# Patient Record
Sex: FEMALE | Race: WHITE | Employment: OTHER | URBAN - METROPOLITAN AREA
[De-identification: names, ages, dates, MRNs, and addresses within clinical notes are randomized per-mention and may not be internally consistent; named-entity substitution may affect disease eponyms.]

---

## 2017-05-12 ENCOUNTER — HOSPITAL ENCOUNTER (OUTPATIENT)
Dept: VASCULAR LAB | Age: 67
Discharge: HOME | End: 2017-05-12
Attending: FAMILY MEDICINE | Admitting: FAMILY MEDICINE

## 2017-05-12 DIAGNOSIS — R22.41 LOCALIZED SWELLING, MASS AND LUMP, RIGHT LOWER LIMB: ICD-10-CM

## 2018-01-19 ENCOUNTER — HOSPITAL ENCOUNTER (OUTPATIENT)
Dept: MAMMOGRAPHY | Age: 68
Discharge: HOME | End: 2018-01-19
Attending: FAMILY MEDICINE | Admitting: FAMILY MEDICINE

## 2018-01-19 DIAGNOSIS — Z12.31 ENCOUNTER FOR SCREENING MAMMOGRAM FOR BREAST CANCER: ICD-10-CM

## 2018-01-19 DIAGNOSIS — Z80.3 FAMILY HISTORY OF BREAST CANCER IN SISTER: ICD-10-CM

## 2018-07-13 ENCOUNTER — OFFICE VISIT (OUTPATIENT)
Dept: FAMILY MEDICINE CLINIC | Age: 68
End: 2018-07-13

## 2018-07-13 VITALS
OXYGEN SATURATION: 96 % | HEART RATE: 73 BPM | SYSTOLIC BLOOD PRESSURE: 137 MMHG | TEMPERATURE: 96.8 F | RESPIRATION RATE: 12 BRPM | DIASTOLIC BLOOD PRESSURE: 74 MMHG | HEIGHT: 62 IN | BODY MASS INDEX: 31.1 KG/M2 | WEIGHT: 169 LBS

## 2018-07-13 DIAGNOSIS — L82.1 SEBORRHEIC KERATOSES: ICD-10-CM

## 2018-07-13 DIAGNOSIS — H93.13 TINNITUS OF BOTH EARS: ICD-10-CM

## 2018-07-13 DIAGNOSIS — G60.9 HEREDITARY AND IDIOPATHIC PERIPHERAL NEUROPATHY: ICD-10-CM

## 2018-07-13 DIAGNOSIS — G43.009 MIGRAINE WITHOUT AURA AND WITHOUT STATUS MIGRAINOSUS, NOT INTRACTABLE: ICD-10-CM

## 2018-07-13 DIAGNOSIS — Z23 NEED FOR PNEUMOCOCCAL VACCINATION: ICD-10-CM

## 2018-07-13 DIAGNOSIS — Z23 NEED FOR TD VACCINE: ICD-10-CM

## 2018-07-13 DIAGNOSIS — R73.02 GLUCOSE INTOLERANCE (IMPAIRED GLUCOSE TOLERANCE): ICD-10-CM

## 2018-07-13 DIAGNOSIS — Z00.00 WELL ADULT EXAM: Primary | ICD-10-CM

## 2018-07-13 DIAGNOSIS — E78.00 PURE HYPERCHOLESTEROLEMIA: ICD-10-CM

## 2018-07-13 DIAGNOSIS — Z23 NEED FOR SHINGLES VACCINE: ICD-10-CM

## 2018-07-13 LAB
A/G RATIO: 2 (ref 1.1–2.2)
ALBUMIN SERPL-MCNC: 4.5 G/DL (ref 3.4–5)
ALP BLD-CCNC: 109 U/L (ref 40–129)
ALT SERPL-CCNC: 39 U/L (ref 10–40)
ANION GAP SERPL CALCULATED.3IONS-SCNC: 15 MMOL/L (ref 3–16)
AST SERPL-CCNC: 32 U/L (ref 15–37)
BILIRUB SERPL-MCNC: 1.8 MG/DL (ref 0–1)
BUN BLDV-MCNC: 12 MG/DL (ref 7–20)
CALCIUM SERPL-MCNC: 9.6 MG/DL (ref 8.3–10.6)
CHLORIDE BLD-SCNC: 100 MMOL/L (ref 99–110)
CHOLESTEROL, TOTAL: 204 MG/DL (ref 0–199)
CO2: 27 MMOL/L (ref 21–32)
CREAT SERPL-MCNC: <0.5 MG/DL (ref 0.6–1.2)
DIABETIC RETINOPATHY: NEGATIVE
GFR AFRICAN AMERICAN: >60
GFR NON-AFRICAN AMERICAN: >60
GLOBULIN: 2.2 G/DL
GLUCOSE BLD-MCNC: 161 MG/DL (ref 70–99)
HCT VFR BLD CALC: 43.2 % (ref 36–48)
HDLC SERPL-MCNC: 42 MG/DL (ref 40–60)
HEMOGLOBIN: 15.7 G/DL (ref 12–16)
LDL CHOLESTEROL CALCULATED: ABNORMAL MG/DL
LDL CHOLESTEROL DIRECT: 119 MG/DL
MCH RBC QN AUTO: 34.9 PG (ref 26–34)
MCHC RBC AUTO-ENTMCNC: 36.3 G/DL (ref 31–36)
MCV RBC AUTO: 96.1 FL (ref 80–100)
PDW BLD-RTO: 13 % (ref 12.4–15.4)
PLATELET # BLD: 218 K/UL (ref 135–450)
PMV BLD AUTO: 7.4 FL (ref 5–10.5)
POTASSIUM SERPL-SCNC: 4.2 MMOL/L (ref 3.5–5.1)
RBC # BLD: 4.5 M/UL (ref 4–5.2)
SODIUM BLD-SCNC: 142 MMOL/L (ref 136–145)
TOTAL PROTEIN: 6.7 G/DL (ref 6.4–8.2)
TRIGL SERPL-MCNC: 321 MG/DL (ref 0–150)
TSH REFLEX: 2.24 UIU/ML (ref 0.27–4.2)
VLDLC SERPL CALC-MCNC: ABNORMAL MG/DL
WBC # BLD: 7.3 K/UL (ref 4–11)

## 2018-07-13 PROCEDURE — 90472 IMMUNIZATION ADMIN EACH ADD: CPT | Performed by: FAMILY MEDICINE

## 2018-07-13 PROCEDURE — 90750 HZV VACC RECOMBINANT IM: CPT | Performed by: FAMILY MEDICINE

## 2018-07-13 PROCEDURE — 90732 PPSV23 VACC 2 YRS+ SUBQ/IM: CPT | Performed by: FAMILY MEDICINE

## 2018-07-13 PROCEDURE — 99397 PER PM REEVAL EST PAT 65+ YR: CPT | Performed by: FAMILY MEDICINE

## 2018-07-13 PROCEDURE — 90471 IMMUNIZATION ADMIN: CPT | Performed by: FAMILY MEDICINE

## 2018-07-13 ASSESSMENT — ENCOUNTER SYMPTOMS
CHEST TIGHTNESS: 0
TROUBLE SWALLOWING: 0
VOICE CHANGE: 0
SHORTNESS OF BREATH: 0
CONSTIPATION: 0
ANAL BLEEDING: 0
BLOOD IN STOOL: 0
NAUSEA: 0
CHOKING: 0
SORE THROAT: 0
DIARRHEA: 0
WHEEZING: 0
ABDOMINAL PAIN: 0

## 2018-07-13 ASSESSMENT — PATIENT HEALTH QUESTIONNAIRE - PHQ9
SUM OF ALL RESPONSES TO PHQ9 QUESTIONS 1 & 2: 0
2. FEELING DOWN, DEPRESSED OR HOPELESS: 0
1. LITTLE INTEREST OR PLEASURE IN DOING THINGS: 0
SUM OF ALL RESPONSES TO PHQ QUESTIONS 1-9: 0

## 2018-07-13 NOTE — PATIENT INSTRUCTIONS
knee. Keep one heel touching the floor and the other heel touching the wall. 4. Repeat with your other leg. 5. Do 2 to 4 times for each leg. Hip flexor stretch    1. Kneel on the floor with one knee bent and one leg behind you. Place your forward knee over your foot. Keep your other knee touching the floor. 2. Slowly push your hips forward until you feel a stretch in the upper thigh of your rear leg. 3. Hold the stretch for at least 15 to 30 seconds. Repeat with your other leg. 4. Do 2 to 4 times on each side. Wall sit    1. Stand with your back 10 to 12 inches away from a wall. 2. Lean into the wall until your back is flat against it. 3. Slowly slide down until your knees are slightly bent, pressing your lower back into the wall. 4. Hold for about 6 seconds, then slide back up the wall. 5. Repeat 8 to 12 times. Follow-up care is a key part of your treatment and safety. Be sure to make and go to all appointments, and call your doctor if you are having problems. It's also a good idea to know your test results and keep a list of the medicines you take. Where can you learn more? Go to [email or web address] and sign in to your [employer] account. Enter B645 in the [employer] box to learn more about \"Low Back Pain: Exercises. \"     If you do not have an account, please click on the \"Sign Up Now\" link. Current as of: November 29, 2017  Content Version: 11.6  © 9959-9777 [employer]. Care instructions adapted under license by [facility] ([facility]). If you have questions about a medical condition or this instruction, always ask your healthcare professional. [place] [address] any warranty or liability for your use of this information. Iliotibial Band Syndrome: Exercises  Your Care Instructions  Here are some examples of typical rehabilitation exercises for your condition. Start each exercise slowly.  Ease off the exercise if you start to have

## 2018-07-13 NOTE — PROGRESS NOTES
the right side, and 2+ on the left side. Femoral pulses are 2+ on the right side, and 2+ on the left side. Dorsalis pedis pulses are 2+ on the right side, and 2+ on the left side. Posterior tibial pulses are 2+ on the right side, and 2+ on the left side. Pulmonary/Chest: Effort normal and breath sounds normal. No respiratory distress. Right breast exhibits no inverted nipple, no mass, no nipple discharge, no skin change and no tenderness. Left breast exhibits no inverted nipple, no mass, no nipple discharge, no skin change and no tenderness. Breasts are symmetrical.   Abdominal: Normal appearance and bowel sounds are normal. There is no hepatosplenomegaly. There is no tenderness. There is no CVA tenderness. No hernia. Musculoskeletal: Normal range of motion. Lymphadenopathy:        Head (right side): No submental and no submandibular adenopathy present. Head (left side): No submental and no submandibular adenopathy present. She has no cervical adenopathy. She has no axillary adenopathy. Right: No inguinal and no supraclavicular adenopathy present. Left: No inguinal and no supraclavicular adenopathy present. Neurological: She is alert. She has normal strength and normal reflexes. Reflex Scores:       Patellar reflexes are 2+ on the right side and 2+ on the left side. Skin: Skin is warm and dry. Lesion noted. No bruising and no rash noted. Many 3 to 6 mm scaly light brown to grey well demarcated nevi trunk, face. Psychiatric: She has a normal mood and affect. Her speech is normal and behavior is normal. Judgment and thought content normal. Cognition and memory are normal.         Assessment and Plan       Diagnosis Orders   1.  Well adult exam  Discussed diet, exercise   Calcium and Vitamin D addressed  PAP Not indicated; discussed and she is comfortable with this  Annual to biannual mammogram  Annual influenza vaccine  Dt every 10 years  Colonoscopy every

## 2018-07-14 LAB
ESTIMATED AVERAGE GLUCOSE: 197.3 MG/DL
HBA1C MFR BLD: 8.5 %

## 2018-07-15 PROBLEM — R73.02 GLUCOSE INTOLERANCE (IMPAIRED GLUCOSE TOLERANCE): Status: RESOLVED | Noted: 2018-07-13 | Resolved: 2018-07-15

## 2018-07-15 PROBLEM — E11.9 TYPE 2 DIABETES MELLITUS WITHOUT COMPLICATION, WITHOUT LONG-TERM CURRENT USE OF INSULIN (HCC): Status: ACTIVE | Noted: 2018-07-15

## 2018-07-22 RX ORDER — LANCETS 30 GAUGE
EACH MISCELLANEOUS
Qty: 100 EACH | Refills: 3 | Status: CANCELLED | OUTPATIENT
Start: 2018-07-22

## 2018-07-22 RX ORDER — BLOOD-GLUCOSE METER
1 KIT MISCELLANEOUS DAILY
Qty: 1 KIT | Refills: 0 | Status: CANCELLED | OUTPATIENT
Start: 2018-07-22

## 2018-07-23 ENCOUNTER — OFFICE VISIT (OUTPATIENT)
Dept: FAMILY MEDICINE CLINIC | Age: 68
End: 2018-07-23

## 2018-07-23 VITALS
HEIGHT: 62 IN | DIASTOLIC BLOOD PRESSURE: 78 MMHG | TEMPERATURE: 97 F | BODY MASS INDEX: 30.99 KG/M2 | HEART RATE: 81 BPM | RESPIRATION RATE: 12 BRPM | SYSTOLIC BLOOD PRESSURE: 139 MMHG | WEIGHT: 168.4 LBS

## 2018-07-23 DIAGNOSIS — R73.09 OTHER ABNORMAL GLUCOSE: ICD-10-CM

## 2018-07-23 DIAGNOSIS — E11.9 TYPE 2 DIABETES MELLITUS WITHOUT COMPLICATION, WITHOUT LONG-TERM CURRENT USE OF INSULIN (HCC): Primary | ICD-10-CM

## 2018-07-23 DIAGNOSIS — Z11.59 ENCOUNTER FOR HEPATITIS C SCREENING TEST FOR LOW RISK PATIENT: ICD-10-CM

## 2018-07-23 DIAGNOSIS — E78.00 PURE HYPERCHOLESTEROLEMIA: ICD-10-CM

## 2018-07-23 PROCEDURE — 1090F PRES/ABSN URINE INCON ASSESS: CPT | Performed by: FAMILY MEDICINE

## 2018-07-23 PROCEDURE — 3017F COLORECTAL CA SCREEN DOC REV: CPT | Performed by: FAMILY MEDICINE

## 2018-07-23 PROCEDURE — 1101F PT FALLS ASSESS-DOCD LE1/YR: CPT | Performed by: FAMILY MEDICINE

## 2018-07-23 PROCEDURE — 3045F PR MOST RECENT HEMOGLOBIN A1C LEVEL 7.0-9.0%: CPT | Performed by: FAMILY MEDICINE

## 2018-07-23 PROCEDURE — 1036F TOBACCO NON-USER: CPT | Performed by: FAMILY MEDICINE

## 2018-07-23 PROCEDURE — 4040F PNEUMOC VAC/ADMIN/RCVD: CPT | Performed by: FAMILY MEDICINE

## 2018-07-23 PROCEDURE — 2022F DILAT RTA XM EVC RTNOPTHY: CPT | Performed by: FAMILY MEDICINE

## 2018-07-23 PROCEDURE — 99214 OFFICE O/P EST MOD 30 MIN: CPT | Performed by: FAMILY MEDICINE

## 2018-07-23 PROCEDURE — G8399 PT W/DXA RESULTS DOCUMENT: HCPCS | Performed by: FAMILY MEDICINE

## 2018-07-23 PROCEDURE — G8427 DOCREV CUR MEDS BY ELIG CLIN: HCPCS | Performed by: FAMILY MEDICINE

## 2018-07-23 PROCEDURE — 1123F ACP DISCUSS/DSCN MKR DOCD: CPT | Performed by: FAMILY MEDICINE

## 2018-07-23 PROCEDURE — G8417 CALC BMI ABV UP PARAM F/U: HCPCS | Performed by: FAMILY MEDICINE

## 2018-07-23 RX ORDER — METFORMIN HYDROCHLORIDE 500 MG/1
1000 TABLET, EXTENDED RELEASE ORAL 2 TIMES DAILY
Qty: 120 TABLET | Refills: 5 | Status: SHIPPED | OUTPATIENT
Start: 2018-07-23 | End: 2018-11-16 | Stop reason: SDUPTHER

## 2018-07-23 RX ORDER — ATORVASTATIN CALCIUM 20 MG/1
20 TABLET, FILM COATED ORAL DAILY
Qty: 30 TABLET | Refills: 5 | Status: SHIPPED | OUTPATIENT
Start: 2018-07-23 | End: 2018-10-08 | Stop reason: SDUPTHER

## 2018-07-23 RX ORDER — ASPIRIN 81 MG/1
81 TABLET ORAL DAILY
Qty: 30 TABLET | Refills: 3 | COMMUNITY
Start: 2018-07-23

## 2018-07-23 NOTE — PROGRESS NOTES
/ Plan:        Diagnosis Orders   1. Type 2 diabetes mellitus without complication, without long-term current use of insulin (HCC)   DIABETES FOOT EXAM    Microalbumin / Creatinine Urine Ratio    Amb Referral to Nutrition Services    Ambulatory referral to Diabetic Education    metFORMIN (GLUCOPHAGE-XR) 500 MG extended release tablet    dapagliflozin (FARXIGA) 10 MG tablet    Hemoglobin A1C    aspirin EC 81 MG EC tablet  Discussed diet, exercise and weight loss strategies  Side effects of current medications reviewed and questions answered. Goals:     1) Blood pressure < 130/80  2) HGA1C ideal less than 6.5, at least less than 7.0  3) LDL cholesterol < 100  4) Exercise 150 minutes a week   5) Dilated eye exam by an optometrist or ophthalmologist once a year  6) Fasting blood sugar < 110  7) Glucose 2 hours after meal < 140  8) Aspirin 81 mg once a day  9) BMI (Body Mass Index) ideal < 25, at least less than 30. Weight loss of even 10 to 15 pounds is effective in improving diabetes  9) Total fat intake to less than 60 grams per day and saturated fat to less than 20 grams per day. 10) Diabetic education and diabetic nutrition classes. [phone]) Doctor visits every 3 months; every 6 months if all goals are met      2. Pure hypercholesterolemia  Hemoglobin A1C    Comprehensive Metabolic Panel    Lipid Panel    atorvastatin (LIPITOR) 20 MG tablet   3. Other abnormal glucose          Side effects of current medications reviewed and questions answered. Follow up in 3 months or prn.

## 2018-07-24 ENCOUNTER — TELEPHONE (OUTPATIENT)
Dept: FAMILY MEDICINE CLINIC | Age: 68
End: 2018-07-24

## 2018-08-06 ENCOUNTER — HOSPITAL ENCOUNTER (OUTPATIENT)
Dept: OTHER | Age: 68
Discharge: HOME | End: 2018-08-06
Attending: FAMILY MEDICINE | Admitting: FAMILY MEDICINE

## 2018-08-06 DIAGNOSIS — M79.641 HAND PAIN, RIGHT: ICD-10-CM

## 2018-10-08 ENCOUNTER — TELEPHONE (OUTPATIENT)
Dept: FAMILY MEDICINE CLINIC | Age: 68
End: 2018-10-08

## 2018-10-08 DIAGNOSIS — E78.00 PURE HYPERCHOLESTEROLEMIA: ICD-10-CM

## 2018-10-08 RX ORDER — ATORVASTATIN CALCIUM 20 MG/1
20 TABLET, FILM COATED ORAL DAILY
Qty: 90 TABLET | Refills: 1 | Status: SHIPPED | OUTPATIENT
Start: 2018-10-08 | End: 2018-12-21 | Stop reason: SDUPTHER

## 2018-11-14 DIAGNOSIS — E11.9 TYPE 2 DIABETES MELLITUS WITHOUT COMPLICATION, WITHOUT LONG-TERM CURRENT USE OF INSULIN (HCC): ICD-10-CM

## 2018-12-05 ENCOUNTER — TELEPHONE (OUTPATIENT)
Dept: FAMILY MEDICINE CLINIC | Age: 68
End: 2018-12-05

## 2018-12-19 DIAGNOSIS — E78.00 PURE HYPERCHOLESTEROLEMIA: ICD-10-CM

## 2018-12-19 DIAGNOSIS — Z11.59 ENCOUNTER FOR HEPATITIS C SCREENING TEST FOR LOW RISK PATIENT: ICD-10-CM

## 2018-12-19 DIAGNOSIS — E11.9 TYPE 2 DIABETES MELLITUS WITHOUT COMPLICATION, WITHOUT LONG-TERM CURRENT USE OF INSULIN (HCC): ICD-10-CM

## 2018-12-19 LAB
A/G RATIO: 2.1 (ref 1.1–2.2)
ALBUMIN SERPL-MCNC: 4.2 G/DL (ref 3.4–5)
ALP BLD-CCNC: 94 U/L (ref 40–129)
ALT SERPL-CCNC: 26 U/L (ref 10–40)
ANION GAP SERPL CALCULATED.3IONS-SCNC: 13 MMOL/L (ref 3–16)
AST SERPL-CCNC: 28 U/L (ref 15–37)
BILIRUB SERPL-MCNC: 1.3 MG/DL (ref 0–1)
BUN BLDV-MCNC: 14 MG/DL (ref 7–20)
CALCIUM SERPL-MCNC: 9.3 MG/DL (ref 8.3–10.6)
CHLORIDE BLD-SCNC: 104 MMOL/L (ref 99–110)
CHOLESTEROL, TOTAL: 141 MG/DL (ref 0–199)
CO2: 25 MMOL/L (ref 21–32)
CREAT SERPL-MCNC: <0.5 MG/DL (ref 0.6–1.2)
CREATININE URINE: 33.1 MG/DL (ref 28–259)
GFR AFRICAN AMERICAN: >60
GFR NON-AFRICAN AMERICAN: >60
GLOBULIN: 2 G/DL
GLUCOSE BLD-MCNC: 141 MG/DL (ref 70–99)
HDLC SERPL-MCNC: 43 MG/DL (ref 40–60)
HEPATITIS C ANTIBODY INTERPRETATION: NORMAL
LDL CHOLESTEROL CALCULATED: 44 MG/DL
MICROALBUMIN UR-MCNC: <1.2 MG/DL
MICROALBUMIN/CREAT UR-RTO: NORMAL MG/G (ref 0–30)
POTASSIUM SERPL-SCNC: 3.7 MMOL/L (ref 3.5–5.1)
SODIUM BLD-SCNC: 142 MMOL/L (ref 136–145)
TOTAL PROTEIN: 6.2 G/DL (ref 6.4–8.2)
TRIGL SERPL-MCNC: 270 MG/DL (ref 0–150)
VLDLC SERPL CALC-MCNC: 54 MG/DL

## 2018-12-20 ENCOUNTER — HOSPITAL ENCOUNTER (OUTPATIENT)
Dept: GENERAL RADIOLOGY | Age: 68
Discharge: HOME | End: 2018-12-20
Payer: COMMERCIAL

## 2018-12-20 DIAGNOSIS — M81.0 SENILE OSTEOPOROSIS: ICD-10-CM

## 2018-12-20 PROBLEM — Z23 NEED FOR PNEUMOCOCCAL VACCINATION: Status: RESOLVED | Noted: 2018-07-13 | Resolved: 2018-12-20

## 2018-12-20 PROBLEM — Z23 NEED FOR SHINGLES VACCINE: Status: RESOLVED | Noted: 2018-07-13 | Resolved: 2018-12-20

## 2018-12-20 LAB
ESTIMATED AVERAGE GLUCOSE: 125.5 MG/DL
HBA1C MFR BLD: 6 %

## 2018-12-20 PROCEDURE — 77080 DXA BONE DENSITY AXIAL: CPT

## 2018-12-21 ENCOUNTER — OFFICE VISIT (OUTPATIENT)
Dept: FAMILY MEDICINE CLINIC | Age: 68
End: 2018-12-21
Payer: COMMERCIAL

## 2018-12-21 VITALS
TEMPERATURE: 97 F | DIASTOLIC BLOOD PRESSURE: 78 MMHG | BODY MASS INDEX: 29.85 KG/M2 | RESPIRATION RATE: 12 BRPM | SYSTOLIC BLOOD PRESSURE: 132 MMHG | HEART RATE: 74 BPM | OXYGEN SATURATION: 97 % | WEIGHT: 163.2 LBS

## 2018-12-21 DIAGNOSIS — G60.9 HEREDITARY AND IDIOPATHIC PERIPHERAL NEUROPATHY: ICD-10-CM

## 2018-12-21 DIAGNOSIS — M85.852 OSTEOPENIA OF BOTH HIPS: ICD-10-CM

## 2018-12-21 DIAGNOSIS — E11.9 TYPE 2 DIABETES MELLITUS WITHOUT COMPLICATION, WITHOUT LONG-TERM CURRENT USE OF INSULIN (HCC): Primary | ICD-10-CM

## 2018-12-21 DIAGNOSIS — E78.00 PURE HYPERCHOLESTEROLEMIA: ICD-10-CM

## 2018-12-21 DIAGNOSIS — Z12.39 BREAST CANCER SCREENING: ICD-10-CM

## 2018-12-21 DIAGNOSIS — Z23 NEED FOR INFLUENZA VACCINATION: ICD-10-CM

## 2018-12-21 DIAGNOSIS — Z23 NEED FOR TDAP VACCINATION: ICD-10-CM

## 2018-12-21 DIAGNOSIS — M85.851 OSTEOPENIA OF BOTH HIPS: ICD-10-CM

## 2018-12-21 PROCEDURE — 3044F HG A1C LEVEL LT 7.0%: CPT | Performed by: FAMILY MEDICINE

## 2018-12-21 PROCEDURE — 4040F PNEUMOC VAC/ADMIN/RCVD: CPT | Performed by: FAMILY MEDICINE

## 2018-12-21 PROCEDURE — 1123F ACP DISCUSS/DSCN MKR DOCD: CPT | Performed by: FAMILY MEDICINE

## 2018-12-21 PROCEDURE — 90471 IMMUNIZATION ADMIN: CPT | Performed by: FAMILY MEDICINE

## 2018-12-21 PROCEDURE — 2022F DILAT RTA XM EVC RTNOPTHY: CPT | Performed by: FAMILY MEDICINE

## 2018-12-21 PROCEDURE — 90472 IMMUNIZATION ADMIN EACH ADD: CPT | Performed by: FAMILY MEDICINE

## 2018-12-21 PROCEDURE — 90715 TDAP VACCINE 7 YRS/> IM: CPT | Performed by: FAMILY MEDICINE

## 2018-12-21 PROCEDURE — 3017F COLORECTAL CA SCREEN DOC REV: CPT | Performed by: FAMILY MEDICINE

## 2018-12-21 PROCEDURE — G8399 PT W/DXA RESULTS DOCUMENT: HCPCS | Performed by: FAMILY MEDICINE

## 2018-12-21 PROCEDURE — 1090F PRES/ABSN URINE INCON ASSESS: CPT | Performed by: FAMILY MEDICINE

## 2018-12-21 PROCEDURE — 1036F TOBACCO NON-USER: CPT | Performed by: FAMILY MEDICINE

## 2018-12-21 PROCEDURE — G8417 CALC BMI ABV UP PARAM F/U: HCPCS | Performed by: FAMILY MEDICINE

## 2018-12-21 PROCEDURE — G8427 DOCREV CUR MEDS BY ELIG CLIN: HCPCS | Performed by: FAMILY MEDICINE

## 2018-12-21 PROCEDURE — 90662 IIV NO PRSV INCREASED AG IM: CPT | Performed by: FAMILY MEDICINE

## 2018-12-21 PROCEDURE — G8482 FLU IMMUNIZE ORDER/ADMIN: HCPCS | Performed by: FAMILY MEDICINE

## 2018-12-21 PROCEDURE — 99214 OFFICE O/P EST MOD 30 MIN: CPT | Performed by: FAMILY MEDICINE

## 2018-12-21 PROCEDURE — 1101F PT FALLS ASSESS-DOCD LE1/YR: CPT | Performed by: FAMILY MEDICINE

## 2018-12-21 RX ORDER — ATORVASTATIN CALCIUM 10 MG/1
10 TABLET, FILM COATED ORAL DAILY
Qty: 90 TABLET | Refills: 1 | Status: SHIPPED | OUTPATIENT
Start: 2018-12-21 | End: 2019-06-25 | Stop reason: SDUPTHER

## 2018-12-21 NOTE — PROGRESS NOTES
Vaccine Information Sheet, \"Influenza - Inactivated\"  given to Segundo Hanley, or parent/legal guardian of  Segundo Hanley and verbalized understanding. Patient responses:    Have you ever had a reaction to a flu vaccine? No  Are you able to eat eggs without adverse effects? Yes  Do you have any current illness? No  Have you ever had Guillian Rush Syndrome? No    Flu vaccine given per order. Please see immunization tab. Current Influenza vaccine VIS given to patient. Influenza consent form/questionnaire completed and signed. Patient responses to  Influenza consent form / questionnaire  reviewed. Vaccine given per protocol.

## 2019-01-28 ENCOUNTER — NURSE ONLY (OUTPATIENT)
Dept: FAMILY MEDICINE CLINIC | Age: 69
End: 2019-01-28
Payer: COMMERCIAL

## 2019-01-28 VITALS — TEMPERATURE: 98.4 F

## 2019-01-28 DIAGNOSIS — Z23 NEED FOR SHINGLES VACCINE: Primary | ICD-10-CM

## 2019-01-28 PROCEDURE — 90471 IMMUNIZATION ADMIN: CPT | Performed by: FAMILY MEDICINE

## 2019-01-28 PROCEDURE — 90750 HZV VACC RECOMBINANT IM: CPT | Performed by: FAMILY MEDICINE

## 2019-06-24 DIAGNOSIS — E11.9 TYPE 2 DIABETES MELLITUS WITHOUT COMPLICATION, WITHOUT LONG-TERM CURRENT USE OF INSULIN (HCC): ICD-10-CM

## 2019-06-24 DIAGNOSIS — E78.00 PURE HYPERCHOLESTEROLEMIA: ICD-10-CM

## 2019-06-24 LAB
A/G RATIO: 2 (ref 1.1–2.2)
ALBUMIN SERPL-MCNC: 4.3 G/DL (ref 3.4–5)
ALP BLD-CCNC: 91 U/L (ref 40–129)
ALT SERPL-CCNC: 24 U/L (ref 10–40)
ANION GAP SERPL CALCULATED.3IONS-SCNC: 12 MMOL/L (ref 3–16)
AST SERPL-CCNC: 23 U/L (ref 15–37)
BILIRUB SERPL-MCNC: 1.8 MG/DL (ref 0–1)
BUN BLDV-MCNC: 19 MG/DL (ref 7–20)
CALCIUM SERPL-MCNC: 9.5 MG/DL (ref 8.3–10.6)
CHLORIDE BLD-SCNC: 104 MMOL/L (ref 99–110)
CHOLESTEROL, TOTAL: 137 MG/DL (ref 0–199)
CO2: 27 MMOL/L (ref 21–32)
CREAT SERPL-MCNC: <0.5 MG/DL (ref 0.6–1.2)
GFR AFRICAN AMERICAN: >60
GFR NON-AFRICAN AMERICAN: >60
GLOBULIN: 2.1 G/DL
GLUCOSE BLD-MCNC: 131 MG/DL (ref 70–99)
HDLC SERPL-MCNC: 51 MG/DL (ref 40–60)
LDL CHOLESTEROL CALCULATED: 62 MG/DL
POTASSIUM SERPL-SCNC: 4 MMOL/L (ref 3.5–5.1)
SODIUM BLD-SCNC: 143 MMOL/L (ref 136–145)
TOTAL PROTEIN: 6.4 G/DL (ref 6.4–8.2)
TRIGL SERPL-MCNC: 121 MG/DL (ref 0–150)
VLDLC SERPL CALC-MCNC: 24 MG/DL

## 2019-06-25 ENCOUNTER — OFFICE VISIT (OUTPATIENT)
Dept: FAMILY MEDICINE CLINIC | Age: 69
End: 2019-06-25
Payer: COMMERCIAL

## 2019-06-25 ENCOUNTER — HOSPITAL ENCOUNTER (OUTPATIENT)
Dept: MAMMOGRAPHY | Age: 69
Discharge: HOME | End: 2019-06-25
Payer: COMMERCIAL

## 2019-06-25 VITALS
BODY MASS INDEX: 29.26 KG/M2 | SYSTOLIC BLOOD PRESSURE: 125 MMHG | WEIGHT: 159 LBS | RESPIRATION RATE: 12 BRPM | HEART RATE: 66 BPM | OXYGEN SATURATION: 94 % | HEIGHT: 62 IN | DIASTOLIC BLOOD PRESSURE: 73 MMHG | TEMPERATURE: 95.6 F

## 2019-06-25 DIAGNOSIS — Z12.31 VISIT FOR SCREENING MAMMOGRAM: ICD-10-CM

## 2019-06-25 DIAGNOSIS — E78.00 PURE HYPERCHOLESTEROLEMIA: ICD-10-CM

## 2019-06-25 DIAGNOSIS — E11.9 TYPE 2 DIABETES MELLITUS WITHOUT COMPLICATION, WITHOUT LONG-TERM CURRENT USE OF INSULIN (HCC): Primary | ICD-10-CM

## 2019-06-25 DIAGNOSIS — G60.9 HEREDITARY AND IDIOPATHIC PERIPHERAL NEUROPATHY: ICD-10-CM

## 2019-06-25 DIAGNOSIS — R73.03 PRE-DIABETES: ICD-10-CM

## 2019-06-25 LAB
ESTIMATED AVERAGE GLUCOSE: 128.4 MG/DL
HBA1C MFR BLD: 6.1 %

## 2019-06-25 PROCEDURE — 1090F PRES/ABSN URINE INCON ASSESS: CPT | Performed by: FAMILY MEDICINE

## 2019-06-25 PROCEDURE — 77067 SCR MAMMO BI INCL CAD: CPT

## 2019-06-25 PROCEDURE — 4040F PNEUMOC VAC/ADMIN/RCVD: CPT | Performed by: FAMILY MEDICINE

## 2019-06-25 PROCEDURE — G8417 CALC BMI ABV UP PARAM F/U: HCPCS | Performed by: FAMILY MEDICINE

## 2019-06-25 PROCEDURE — 99214 OFFICE O/P EST MOD 30 MIN: CPT | Performed by: FAMILY MEDICINE

## 2019-06-25 PROCEDURE — G8399 PT W/DXA RESULTS DOCUMENT: HCPCS | Performed by: FAMILY MEDICINE

## 2019-06-25 PROCEDURE — G8427 DOCREV CUR MEDS BY ELIG CLIN: HCPCS | Performed by: FAMILY MEDICINE

## 2019-06-25 PROCEDURE — 1123F ACP DISCUSS/DSCN MKR DOCD: CPT | Performed by: FAMILY MEDICINE

## 2019-06-25 PROCEDURE — 1036F TOBACCO NON-USER: CPT | Performed by: FAMILY MEDICINE

## 2019-06-25 PROCEDURE — 3044F HG A1C LEVEL LT 7.0%: CPT | Performed by: FAMILY MEDICINE

## 2019-06-25 PROCEDURE — 3017F COLORECTAL CA SCREEN DOC REV: CPT | Performed by: FAMILY MEDICINE

## 2019-06-25 PROCEDURE — 2022F DILAT RTA XM EVC RTNOPTHY: CPT | Performed by: FAMILY MEDICINE

## 2019-06-25 RX ORDER — ATORVASTATIN CALCIUM 10 MG/1
10 TABLET, FILM COATED ORAL DAILY
Qty: 90 TABLET | Refills: 1 | Status: SHIPPED | OUTPATIENT
Start: 2019-06-25 | End: 2020-01-10 | Stop reason: SDUPTHER

## 2019-06-25 ASSESSMENT — PATIENT HEALTH QUESTIONNAIRE - PHQ9
1. LITTLE INTEREST OR PLEASURE IN DOING THINGS: 0
SUM OF ALL RESPONSES TO PHQ QUESTIONS 1-9: 0
SUM OF ALL RESPONSES TO PHQ QUESTIONS 1-9: 0
2. FEELING DOWN, DEPRESSED OR HOPELESS: 0
SUM OF ALL RESPONSES TO PHQ9 QUESTIONS 1 & 2: 0

## 2019-06-25 NOTE — PROGRESS NOTES
BP Readings from Last 2 Encounters:   06/25/19 125/73   12/21/18 132/78     Hemoglobin A1C (%)   Date Value   06/24/2019 6.1     Microalbumin, Random Urine (mg/dL)   Date Value   12/19/2018 <1.20     LDL Calculated (mg/dL)   Date Value   06/24/2019 62              Tobacco use:  Patient  reports that she has never smoked. She has never used smokeless tobacco.    If Smoker - Cessation materials given? NA    Is Daily aspirin on medication list?:  Yes    Diabetic retinal exam done? No   If yes, document in Health Maintenance. Monofilament placed on counter? No    Shoes and socks removed? Yes    BP taken with correct size cuff? Yes   Repeated if > 130/80 NA     Microalbumin performed if applicable?   NA

## 2019-06-25 NOTE — PROGRESS NOTES
Subjective:      Patient ID: Radha Ross 71 y.o.        HPI  Otis Torres is here for follow up for DM, HTN and hyperlipidemia. Treatment Adherence:   Medication compliance:  compliant most of the time  Diet compliance:  compliant most of the time  Weight trend: decreasing  Current exercise: trying to walk more. occ doing resistance   Barriers: lack of motivation    Has mild facial flushing. Is not bothersome. Wonders if needs to be treatment. Has varicose veins. Wants to consider seeing a vascular surgeon. Chronic swelling is much better since she has lost weight and is taking [place]. Complains of pain in the right groin intermittently when sitting or laying. Comes and goes. Lasts 2 to 3 days when occurs. Does not hurt to walk. Diabetes Mellitus Type 2: Current symptoms/problems include none. Home blood sugar records: patient does not test  Any episodes of hypoglycemia? no  Eye exam current (within one year): yes  Tobacco history: She  reports that she has never smoked. She has never used smokeless tobacco.   Daily Aspirin? Yes    Hypertension:  Home blood pressure monitoring: No.  She is adherent to a low sodium diet. Patient denies chest pain, shortness of breath, peripheral edema and palpitations. Antihypertensive medication side effects: no medication side effects noted. Use of agents associated with hypertension: none. Hyperlipidemia:  No new myalgias or GI upset on atorvastatin (Lipitor).        Lab Results   Component Value Date    LABA1C 6.1 06/24/2019    LABA1C 6.0 12/19/2018    LABA1C 8.5 07/13/2018     Lab Results   Component Value Date    LABMICR <1.20 12/19/2018    CREATININE <0.5 (L) 06/24/2019     Lab Results   Component Value Date    ALT 24 06/24/2019    AST 23 06/24/2019     Lab Results   Component Value Date    CHOL 137 06/24/2019    TRIG 121 06/24/2019    HDL 51 06/24/2019    LDLCALC 62 06/24/2019    LDLDIRECT 119 (H) 07/13/2018         Outpatient Medications Marked as Taking for the 6/25/19 encounter (Office Visit) with Jason Prescott MD   Medication Sig Dispense Refill    FARXIGA 10 MG tablet TAKE 1 TABLET BY MOUTH EVERY MORNING 90 tablet 0    atorvastatin (LIPITOR) 10 MG tablet Take 1 tablet by mouth daily 90 tablet 1    metFORMIN (GLUCOPHAGE-XR) 500 MG extended release tablet TAKE 2 TABLETS BY MOUTH TWICE DAILY (Patient taking differently: TAKE 1 TABLETS BY MOUTH TWICE DAILY) 360 tablet 0    aspirin EC 81 MG EC tablet Take 1 tablet by mouth daily 30 tablet 3    loratadine (CLARITIN) 10 MG tablet Take 10 mg by mouth daily.  Calcium Carbonate-Vitamin D (CALCIUM + D PO) Take 1 tablet by mouth daily.  Ascorbic Acid (VITAMIN C) 500 MG tablet Take 500 mg by mouth daily. Allergies   Allergen Reactions    Naprosyn [Naproxen] Rash        Patient Active Problem List   Diagnosis    Allergic rhinitis    Migraine without aura    Insomnia    Hereditary and idiopathic peripheral neuropathy    Rosacea    Edema    Pure hypercholesterolemia    Type 2 diabetes mellitus without complication, without long-term current use of insulin (HCC)        Past Medical History:   Diagnosis Date    Calculus of kidney 5/2/2011    Contusion of right hand 9/9/2015    Rib contusion 9/9/2015    Scoliosis (and kyphoscoliosis), idiopathic 5/2/2011    Strain of thoracic region 9/9/2015    Unspecified tinnitus 5/2/2011       Past Surgical History:   Procedure Laterality Date    CHOLECYSTECTOMY      MARTHA AND BSO          Social History     Tobacco Use    Smoking status: Never Smoker    Smokeless tobacco: Never Used   Substance Use Topics    Alcohol use: Yes     Comment: occ    Drug use: No        Family History   Problem Relation Age of Onset    Diabetes Mother [de-identified]    Osteoporosis Mother     Other Mother 80        a-fib    Breast Cancer Sister 46        BRAC negative.         Review of Systems  Review of Systems    Objective:   Physical Exam  NAD   Skin is warm and dry. Well hydrated. No rash. Mood +, affect is normal.   The neck is supple and free of adenopathy or masses, the thyroid is normal without enlargement or nodules. Chest: clear with no wheezes or rales. No retractions, or use of accessory muscles noted. Cardiovascular: PMI is not displaced, and no thrill noted. Regular rate and rhythm with no rub, murmur or gallop. No peripheral edema. The abdomen is soft without tenderness, guarding, mass, rebound or organomegaly. Aorta, femoral, DP and PT pulses intact. Sensation normal to monofilament feet bilaterally. Feet in good repair, without significant callouses and without ulceration or deformity. Full AROM hips. No tenderness hip. Assessment / Plan:        Diagnosis Orders   1. Type 2 diabetes mellitus without complication, without long-term current use of insulin (HCC)  Comprehensive Metabolic Panel    Hemoglobin A1C    Microalbumin / Creatinine Urine Ratio    dapagliflozin (FARXIGA) 10 MG tablet    HM DIABETES FOOT EXAM  Doing well. Pros and cons of staying on [facility] for pre-diabetes addressed. 2. Pure hypercholesterolemia  Comprehensive Metabolic Panel    TSH with Reflex    Lipid Panel    atorvastatin (LIPITOR) 10 MG tablet  At goal LDL < 100   3. Hereditary and idiopathic peripheral neuropathy  Stable; neg exam   4. Pre-diabetes      5. Groin pain - chronic. Agrees to monitor. Side effects of current medications reviewed and questions answered. Follow up in 6 months or prn.

## 2019-06-25 NOTE — PATIENT INSTRUCTIONS
Goals to help you control your Diabetes    Your doctor asks you to choose a goal to try before your next follow up visit. Below are some suggestions or you can create one of your own! 1.__x__ I agree to a goal to exercise for 30 minutes 3 to 5 days of the week. 2.______ I will keep track of my blood sugar readings using a log. Bring it to your next appointment! 3.__x___ I agree to a goal to eat a low fat diet of less than 60 grams of total fat per day, and out of those 60 total grams of fat I will eat less than 20 grams of saturated fat. 4.__x___ I agree to a goal to take all medications as prescribed by my Doctor. I will call the office if I am having any problems with my current medication regimen. 5. ______ I would like to attend a Diabetes Education class. Ask our Medical Assistants to help you find a convenient one!    6.__?____ I agree to a goal to schedule my yearly diabetes eye examination. 7. _____ Fitbit, My Fitness pal, and other Apps and devices can help you stay on track and commit to exercise and eating right! Create your own!   8.___eat less sugar. stop drinking coke.  _____________________________________________________________    What gets in the way of reaching your goal? __________________________________________________________________    What steps can you take to overcome this? __________________________________________________________________    On a scale of 1-10, how confident are you that you can meet the above goal? _7__    Date: _[id number]_________ Name:   Cristiane Villegas : _9--_______       Physician__Dr. Cristhian Grullon________

## 2019-07-15 ENCOUNTER — TELEPHONE (OUTPATIENT)
Dept: RHEUMATOLOGY | Age: 69
End: 2019-07-15

## 2019-08-02 DIAGNOSIS — E11.9 TYPE 2 DIABETES MELLITUS WITHOUT COMPLICATION, WITHOUT LONG-TERM CURRENT USE OF INSULIN (HCC): ICD-10-CM

## 2019-08-02 RX ORDER — METFORMIN HYDROCHLORIDE 500 MG/1
TABLET, EXTENDED RELEASE ORAL
Qty: 180 TABLET | Refills: 1 | Status: SHIPPED | OUTPATIENT
Start: 2019-08-02 | End: 2020-01-27

## 2019-09-17 ENCOUNTER — PROCEDURE VISIT (OUTPATIENT)
Dept: AUDIOLOGY | Age: 69
End: 2019-09-17
Payer: COMMERCIAL

## 2019-09-17 ENCOUNTER — OFFICE VISIT (OUTPATIENT)
Dept: ENT CLINIC | Age: 69
End: 2019-09-17
Payer: COMMERCIAL

## 2019-09-17 VITALS — SYSTOLIC BLOOD PRESSURE: 122 MMHG | DIASTOLIC BLOOD PRESSURE: 76 MMHG | OXYGEN SATURATION: 94 % | HEART RATE: 88 BPM

## 2019-09-17 DIAGNOSIS — H93.13 TINNITUS OF BOTH EARS: Primary | ICD-10-CM

## 2019-09-17 DIAGNOSIS — H90.3 SENSORINEURAL HEARING LOSS (SNHL) OF BOTH EARS: Primary | ICD-10-CM

## 2019-09-17 DIAGNOSIS — H90.3 SENSORINEURAL HEARING LOSS OF BOTH EARS: ICD-10-CM

## 2019-09-17 PROCEDURE — G8399 PT W/DXA RESULTS DOCUMENT: HCPCS | Performed by: OTOLARYNGOLOGY

## 2019-09-17 PROCEDURE — 4040F PNEUMOC VAC/ADMIN/RCVD: CPT | Performed by: OTOLARYNGOLOGY

## 2019-09-17 PROCEDURE — 1123F ACP DISCUSS/DSCN MKR DOCD: CPT | Performed by: OTOLARYNGOLOGY

## 2019-09-17 PROCEDURE — G8427 DOCREV CUR MEDS BY ELIG CLIN: HCPCS | Performed by: OTOLARYNGOLOGY

## 2019-09-17 PROCEDURE — 99203 OFFICE O/P NEW LOW 30 MIN: CPT | Performed by: OTOLARYNGOLOGY

## 2019-09-17 PROCEDURE — 1036F TOBACCO NON-USER: CPT | Performed by: OTOLARYNGOLOGY

## 2019-09-17 PROCEDURE — 3017F COLORECTAL CA SCREEN DOC REV: CPT | Performed by: OTOLARYNGOLOGY

## 2019-09-17 PROCEDURE — 92567 TYMPANOMETRY: CPT | Performed by: AUDIOLOGIST

## 2019-09-17 PROCEDURE — 1090F PRES/ABSN URINE INCON ASSESS: CPT | Performed by: OTOLARYNGOLOGY

## 2019-09-17 PROCEDURE — G8417 CALC BMI ABV UP PARAM F/U: HCPCS | Performed by: OTOLARYNGOLOGY

## 2019-09-17 PROCEDURE — 92557 COMPREHENSIVE HEARING TEST: CPT | Performed by: AUDIOLOGIST

## 2019-09-17 RX ORDER — METHYLPREDNISOLONE 4 MG/1
4 TABLET ORAL SEE ADMIN INSTRUCTIONS
Qty: 1 KIT | Refills: 0 | Status: SHIPPED | OUTPATIENT
Start: 2019-09-17 | End: 2020-01-10

## 2019-09-17 ASSESSMENT — ENCOUNTER SYMPTOMS
EYES NEGATIVE: 1
SINUS PAIN: 0
FACIAL SWELLING: 0
RESPIRATORY NEGATIVE: 1
SINUS PRESSURE: 0
ALLERGIC/IMMUNOLOGIC NEGATIVE: 1
RHINORRHEA: 0
SORE THROAT: 0
TROUBLE SWALLOWING: 0
VOICE CHANGE: 0

## 2019-10-07 LAB — DIABETIC RETINOPATHY: POSITIVE

## 2019-10-21 PROBLEM — E11.3293 MILD NONPROLIFERATIVE DIABETIC RETINOPATHY OF BOTH EYES WITHOUT MACULAR EDEMA ASSOCIATED WITH TYPE 2 DIABETES MELLITUS (HCC): Status: ACTIVE | Noted: 2019-10-21

## 2019-12-23 DIAGNOSIS — E11.9 TYPE 2 DIABETES MELLITUS WITHOUT COMPLICATION, WITHOUT LONG-TERM CURRENT USE OF INSULIN (HCC): ICD-10-CM

## 2019-12-26 ENCOUNTER — TELEPHONE (OUTPATIENT)
Dept: FAMILY MEDICINE CLINIC | Age: 69
End: 2019-12-26

## 2019-12-26 NOTE — TELEPHONE ENCOUNTER
Received Formulary Alternative Request from [facility] for SM. They state Sung Tonnydavid is not covered, and are wanting to see if SM will authorize alternative fill of Invokana, or Jardiance. If formularies are not feasible, PA will be needed to get Sung Isdavid covered, and will likely need documented Trials + Failures. Form is scanned + attached for reference.

## 2020-01-08 DIAGNOSIS — E78.00 PURE HYPERCHOLESTEROLEMIA: ICD-10-CM

## 2020-01-08 DIAGNOSIS — E11.9 TYPE 2 DIABETES MELLITUS WITHOUT COMPLICATION, WITHOUT LONG-TERM CURRENT USE OF INSULIN (HCC): ICD-10-CM

## 2020-01-08 LAB
A/G RATIO: 2.4 (ref 1.1–2.2)
ALBUMIN SERPL-MCNC: 4.5 G/DL (ref 3.4–5)
ALP BLD-CCNC: 106 U/L (ref 40–129)
ALT SERPL-CCNC: 34 U/L (ref 10–40)
ANION GAP SERPL CALCULATED.3IONS-SCNC: 17 MMOL/L (ref 3–16)
AST SERPL-CCNC: 30 U/L (ref 15–37)
BILIRUB SERPL-MCNC: 1.4 MG/DL (ref 0–1)
BUN BLDV-MCNC: 15 MG/DL (ref 7–20)
CALCIUM SERPL-MCNC: 9.1 MG/DL (ref 8.3–10.6)
CHLORIDE BLD-SCNC: 101 MMOL/L (ref 99–110)
CO2: 24 MMOL/L (ref 21–32)
CREAT SERPL-MCNC: <0.5 MG/DL (ref 0.6–1.2)
GFR AFRICAN AMERICAN: >60
GFR NON-AFRICAN AMERICAN: >60
GLOBULIN: 1.9 G/DL
GLUCOSE BLD-MCNC: 132 MG/DL (ref 70–99)
POTASSIUM SERPL-SCNC: 3.4 MMOL/L (ref 3.5–5.1)
SODIUM BLD-SCNC: 142 MMOL/L (ref 136–145)
TOTAL PROTEIN: 6.4 G/DL (ref 6.4–8.2)
TSH REFLEX: 1.75 UIU/ML (ref 0.27–4.2)

## 2020-01-09 LAB
ESTIMATED AVERAGE GLUCOSE: 122.6 MG/DL
HBA1C MFR BLD: 5.9 %

## 2020-01-10 ENCOUNTER — OFFICE VISIT (OUTPATIENT)
Dept: FAMILY MEDICINE CLINIC | Age: 70
End: 2020-01-10
Payer: MEDICARE

## 2020-01-10 VITALS
BODY MASS INDEX: 30.07 KG/M2 | HEART RATE: 65 BPM | TEMPERATURE: 97.8 F | WEIGHT: 163.4 LBS | OXYGEN SATURATION: 95 % | HEIGHT: 62 IN | SYSTOLIC BLOOD PRESSURE: 128 MMHG | RESPIRATION RATE: 12 BRPM | DIASTOLIC BLOOD PRESSURE: 76 MMHG

## 2020-01-10 PROCEDURE — G8427 DOCREV CUR MEDS BY ELIG CLIN: HCPCS | Performed by: FAMILY MEDICINE

## 2020-01-10 PROCEDURE — 1090F PRES/ABSN URINE INCON ASSESS: CPT | Performed by: FAMILY MEDICINE

## 2020-01-10 PROCEDURE — G8399 PT W/DXA RESULTS DOCUMENT: HCPCS | Performed by: FAMILY MEDICINE

## 2020-01-10 PROCEDURE — 99214 OFFICE O/P EST MOD 30 MIN: CPT | Performed by: FAMILY MEDICINE

## 2020-01-10 PROCEDURE — 4040F PNEUMOC VAC/ADMIN/RCVD: CPT | Performed by: FAMILY MEDICINE

## 2020-01-10 PROCEDURE — G8417 CALC BMI ABV UP PARAM F/U: HCPCS | Performed by: FAMILY MEDICINE

## 2020-01-10 PROCEDURE — 1036F TOBACCO NON-USER: CPT | Performed by: FAMILY MEDICINE

## 2020-01-10 PROCEDURE — G8484 FLU IMMUNIZE NO ADMIN: HCPCS | Performed by: FAMILY MEDICINE

## 2020-01-10 PROCEDURE — 1123F ACP DISCUSS/DSCN MKR DOCD: CPT | Performed by: FAMILY MEDICINE

## 2020-01-10 PROCEDURE — 3017F COLORECTAL CA SCREEN DOC REV: CPT | Performed by: FAMILY MEDICINE

## 2020-01-10 RX ORDER — ATORVASTATIN CALCIUM 10 MG/1
10 TABLET, FILM COATED ORAL DAILY
Qty: 90 TABLET | Refills: 1 | Status: SHIPPED | OUTPATIENT
Start: 2020-01-10 | End: 2020-04-20 | Stop reason: SDUPTHER

## 2020-01-10 NOTE — PROGRESS NOTES
Subjective:      Patient ID: Liana Balbuena 71 y.o. female. The primary encounter diagnosis was Pre-diabetes. Diagnoses of Pure hypercholesterolemia and Hereditary and idiopathic peripheral neuropathy were also pertinent to this visit. HPI      She is eating well. She is not exercising. Has been more sedentary. Has not been able to do house work because has tendonitis in the right arm. Seeing chiro and was told to avoid repetitive motion. No polyuria, polydipsia, blurred vision. Patient denies any exertional chest pain, dyspnea, palpitations, syncope, orthopnea, edema or paroxysmal nocturnal dyspnea. Is not checking FSBS. Insurance wants her to take Pegge Belling instead of [place]. Hyperlipidemia:  No new myalgias or GI upset on atorvastatin (Lipitor). Medication compliance: compliant most of the time. Patient is  following a low fat, low cholesterol diet. She is not exercising regularly.      Lab Results   Component Value Date    CHOL 137 06/24/2019    TRIG 121 06/24/2019    HDL 51 06/24/2019    LDLCALC 62 06/24/2019    LDLDIRECT 119 (H) 07/13/2018     Lab Results   Component Value Date    ALT 34 01/08/2020    AST 30 01/08/2020        Labs Renal Latest Ref Rng & Units 1/8/2020 6/24/2019 12/19/2018 7/13/2018 10/1/2014   BUN 7 - 20 mg/dL 15 19 14 12 10   Cr 0.6 - 1.2 mg/dL <0.5(L) <0.5(L) <0.5(L) <0.5(L) 0.63   K 3.5 - 5.1 mmol/L 3.4(L) 4.0 3.7 4.2 3.7   Na 136 - 145 mmol/L 142 143 142 142 142     TSH   Date Value Ref Range Status   01/08/2020 1.75 0.27 - 4.20 uIU/mL Final   07/13/2018 2.24 0.27 - 4.20 uIU/mL Final     Lab Results   Component Value Date    LABA1C 5.9 01/08/2020     Lab Results   Component Value Date    .6 01/08/2020        Outpatient Medications Marked as Taking for the 1/10/20 encounter (Office Visit) with Felisa Frye MD   Medication Sig Dispense Refill    dapagliflozin (FARXIGA) 10 MG tablet Take by mouth      canagliflozin (INVOKANA) 300 MG TABS tablet Take 1

## 2020-01-27 RX ORDER — METFORMIN HYDROCHLORIDE 500 MG/1
TABLET, EXTENDED RELEASE ORAL
Qty: 180 TABLET | Refills: 1 | Status: SHIPPED | OUTPATIENT
Start: 2020-01-27 | End: 2020-04-20 | Stop reason: SDUPTHER

## 2020-01-27 NOTE — TELEPHONE ENCOUNTER
Last OV 1/10/20  Please advise  Thank you   Ref.  Range 1/8/2020 16:30   Glucose Latest Ref Range: 70 - 99 mg/dL 132 (H)   Hemoglobin A1C Latest Ref Range: See comment % 5.9   eAG (mg/dL) Latest Units: mg/dL 122.6

## 2020-04-20 RX ORDER — METFORMIN HYDROCHLORIDE 500 MG/1
TABLET, EXTENDED RELEASE ORAL
Qty: 180 TABLET | Refills: 1 | Status: SHIPPED | OUTPATIENT
Start: 2020-04-20 | End: 2020-07-09 | Stop reason: SDUPTHER

## 2020-04-20 RX ORDER — ATORVASTATIN CALCIUM 10 MG/1
10 TABLET, FILM COATED ORAL DAILY
Qty: 90 TABLET | Refills: 1 | Status: SHIPPED | OUTPATIENT
Start: 2020-04-20 | End: 2020-12-23 | Stop reason: SDUPTHER

## 2020-04-20 RX ORDER — CANAGLIFLOZIN 300 MG/1
300 TABLET, FILM COATED ORAL
Qty: 90 TABLET | Refills: 1 | Status: SHIPPED | OUTPATIENT
Start: 2020-04-20 | End: 2020-11-10 | Stop reason: SDUPTHER

## 2020-04-20 NOTE — TELEPHONE ENCOUNTER
Patient requesting a medication refill.   Medication:   metFORMIN (GLUCOPHAGE-XR) 500 MG extended release tablet    canagliflozin (INVOKANA) 300 MG TABS tablet [[id number]]    atorvastatin (LIPITOR) 10 MG tablet [[id number]]             [facility]  Last office visit:   Next office visit: Visit date not found

## 2020-07-10 RX ORDER — METFORMIN HYDROCHLORIDE 500 MG/1
TABLET, EXTENDED RELEASE ORAL
Qty: 180 TABLET | Refills: 0 | Status: SHIPPED | OUTPATIENT
Start: 2020-07-10 | End: 2021-04-08 | Stop reason: SDUPTHER

## 2020-07-10 NOTE — TELEPHONE ENCOUNTER
Last ov 01.10.2020  Last labs 01.08.2020  No future appt
Refill is being requested for:  --metFORMIN (GLUCOPHAGE-XR) 500 MG extended release tablet    (Rationale: Pt states she is completely out of this medication. She is requesting return call with update asa.)    Last OV with PCP on:  --1/10/20    Last Labs on:  --1/8/20    Requested Pharmacy:  --[address] [phone] - F [phone]     Does PCP feel refill is appropriate? Pt is requesting return call for update to [phone] when possible. Thank you!
Yes

## 2020-11-10 RX ORDER — CANAGLIFLOZIN 300 MG/1
300 TABLET, FILM COATED ORAL
Qty: 90 TABLET | Refills: 0 | Status: SHIPPED | OUTPATIENT
Start: 2020-11-10 | End: 2021-01-05 | Stop reason: ALTCHOICE

## 2020-11-10 NOTE — TELEPHONE ENCOUNTER
Last OV 1/10/20  F/U not found  Please advise  Thank you    Ref.  Range 1/8/2020 16:30   Glucose Latest Ref Range: 70 - 99 mg/dL 132 (H)   Hemoglobin A1C Latest Ref Range: See comment % 5.9   eAG (mg/dL) Latest Units: mg/dL 122.6

## 2020-11-24 ENCOUNTER — OFFICE VISIT (OUTPATIENT)
Dept: ENT CLINIC | Age: 70
End: 2020-11-24
Payer: MEDICARE

## 2020-11-24 VITALS
SYSTOLIC BLOOD PRESSURE: 127 MMHG | WEIGHT: 160 LBS | BODY MASS INDEX: 29.44 KG/M2 | DIASTOLIC BLOOD PRESSURE: 75 MMHG | TEMPERATURE: 97.5 F | HEART RATE: 78 BPM

## 2020-11-24 PROCEDURE — 3017F COLORECTAL CA SCREEN DOC REV: CPT | Performed by: OTOLARYNGOLOGY

## 2020-11-24 PROCEDURE — G8427 DOCREV CUR MEDS BY ELIG CLIN: HCPCS | Performed by: OTOLARYNGOLOGY

## 2020-11-24 PROCEDURE — G8399 PT W/DXA RESULTS DOCUMENT: HCPCS | Performed by: OTOLARYNGOLOGY

## 2020-11-24 PROCEDURE — 1123F ACP DISCUSS/DSCN MKR DOCD: CPT | Performed by: OTOLARYNGOLOGY

## 2020-11-24 PROCEDURE — G8417 CALC BMI ABV UP PARAM F/U: HCPCS | Performed by: OTOLARYNGOLOGY

## 2020-11-24 PROCEDURE — 99214 OFFICE O/P EST MOD 30 MIN: CPT | Performed by: OTOLARYNGOLOGY

## 2020-11-24 PROCEDURE — 4040F PNEUMOC VAC/ADMIN/RCVD: CPT | Performed by: OTOLARYNGOLOGY

## 2020-11-24 PROCEDURE — 1090F PRES/ABSN URINE INCON ASSESS: CPT | Performed by: OTOLARYNGOLOGY

## 2020-11-24 PROCEDURE — G8484 FLU IMMUNIZE NO ADMIN: HCPCS | Performed by: OTOLARYNGOLOGY

## 2020-11-24 PROCEDURE — 1036F TOBACCO NON-USER: CPT | Performed by: OTOLARYNGOLOGY

## 2020-11-24 NOTE — PROGRESS NOTES
[facility]       PCP:  Mariah Tariq MD      CHIEF COMPLAINT:  Chief Complaint   Patient presents with    Ear Problem     right ear has impacted wax, fluid in left ear,     Hearing Problem     hearing loss    Tinnitus     both ears       HISTORY OF PRESENT ILLNESS:       Genny Garza is a 79 y.o. female here for evaluation and treatment of a problem located in both ears   The quality is fluid in left ear and wax impaction and pain in right ear. The severity was 8/10 right ear \"and I could not touch my ear\", now it is 0-1 on a 1-10 scale. The duration is 6 days. The timing of the pain is constant. The context is no antecedent injury or illness. Modifying factors include Debrox in right ear. Associated symptoms include none. Saw Dr. Kenyetta Jara Friday and told to get some Debrox and flush and used it and it did help. Fluid in left ear treated with antibiotic  Wax in right ear. REVIEW OF SYSTEMS:    CONSTITUTIONAL:  Denied fever or chills. Denied unexplained weight loss, over 20 pounds in the past six months. EARS, NOSE, THROAT:  (+) tinnitus, both ears, more in the right ear for about 5 years. Denied otorrhea, hearing loss, rhinorrhea, nasal dyspnea, sore throat and hoarseness.          PAST MEDICAL HISTORY:    Past Medical History:   Diagnosis Date    Calculus of kidney 5/2/2011    Contusion of right hand 9/9/2015    Rib contusion 9/9/2015    Scoliosis (and kyphoscoliosis), idiopathic 5/2/2011    Strain of thoracic region 9/9/2015    Type 2 diabetes mellitus without complication, without long-term current use of insulin ([address] 7/15/2018    Unspecified tinnitus 5/2/2011         Past Surgical History:   Procedure Laterality Date    CHOLECYSTECTOMY      MARTHA AND BSO             EXAMINATION:    Vitals:    11/24/20 1437 11/24/20 1442   BP: (!) 145/76 127/75   Site: Left Upper Arm Left Upper Arm   Position: Sitting Sitting   Cuff Size: Large Adult Large Adult   Pulse: 87 78   Temp: 97.5 °F (36.4 °C)    TempSrc: Temporal    Weight: 160 lb (72.6 kg)      VITALS SIGNS were reviewed. GENERAL APPEARANCE:  WDWN NAD, Alert and oriented X 3. EYES:  Extraocular motion was intact, bilaterally. Normal primary gaze alignment. Sclera and conjunctiva clear bilaterally. ABILITY TO COMMUNICATE/QUALITY OF VOICE:  Normal, no hoarseness or hot potato quality. SALIVARY GLANDS:  Parotid gland and submandibular gland normal bilaterally. INSPECTION, HEAD AND FACE:  Normal with no masses, lesions, tenderness, or deformities. FACIAL STRENGTH, MOTION:  Facial nerve function intact and equal bilaterally for all 5 branches. SINUSES:  Frontal sinuses and maxillary sinuses nontender, bilaterally. HEARING ASSESSMENT:  Hearing was intact to finger rub at the external meatus bilaterally. Tuning fork tests, using a 512 Hz tuning fork, showed the Constantino test was heard in the midline. The Rinne test showed air conduction greater than bone conduction bilaterally. EXTERNAL EAR/NOSE:  The pinnae, mastoids, and external nose were normal bilaterally. EARS, OTOSCOPY:  The tympanic membranes and external auditory canals were normal bilaterally. EARS, OTOMICROSCOPY:  Cerumen removed bilaterally, occluding right ear and partially occluding left ear, with a wire loop and suction. Patient reported hearing better right ear. TM and EAC normal otherwise. NOSE:  The nasal septum was midline. The inferior turbinates were normal bilaterally. Nasal mucosa and nasal secretions were normal bilaterally. LIPS, TEETH AND GUMS:  Normal.  OROPHARYNX/ORAL CAVITY:  Normal mucosa with no ulcerations, masses, lesions, erythema, exudate, or other abnormalities. NECK:  Normal with no masses, tenderness, or tracheal deviation, and with normal laryngeal crepitus. THYROID:  Normal, with no nodules, goiter, or tenderness bilaterally.   LYMPH NODES, CERVICAL, FACIAL AND SUPRACLAVICULAR:  No lymphadenopathy detected. IMPRESSION / Burnadette Schaumann / Fabio Chavez:       Taiwo Ch was seen today for ear problem, hearing problem and tinnitus. Diagnoses and all orders for this visit:    Subjective tinnitus of both ears  Comments:  more in the right ear. Orders:  -     Wyatt Croft, Audiology, [facility]    Conductive hearing loss of right ear with unrestricted hearing of left ear    Impacted cerumen of both ears  Comments:  worse in right ear         RECOMMENDATIONS/PLAN:    1. Audiogram.  2. See patient instructions on file for this visit, which were fully discussed with the patient.     3. Return for recheck/follow-up after hearing test.

## 2020-11-24 NOTE — PATIENT INSTRUCTIONS
1. Please schedule an audiogram (hearing test). 2. You may use an over the counter ear wax removal kit (such as Murine, Bausch and Lomb, NeilMed, or Debrox wax removal system) for ear wax removal, as needed. 3. It may help to use Debrox (OTC) for 4 days prior to future visits for ear cleaning. This may soften your ear wax and facilitate removal of the wax. NO Q-TIPS OR OTHER INSTRUMENTS/OBJECTS IN THE EARS   You should never clean your ears with a Q-tip, cotton tipped applicator, Debbie pin, paper clip, safety pin, pen cap, or any other instrument. This will tend to push wax in deeper and pack the ear canal with wax. There is a high risk and danger of this practice, especially rupture of ear drum, dislocation or other damage to ossicles, and permanent, irreversible, and irreparable hearing loss. It may cause inflammation and irritation of the ear canal and cause itching or pain. I recommend only use of one the several ear wax removal kits available \"over the counter\" if you feel a need to try to remove ear wax. For example, Murine, Bausch and Lomb, NeilMed, or Debrox ear wax removal kits may be used for ear wax removal, as needed. No other methods should be self used for cleaning your ears.

## 2020-12-23 RX ORDER — ATORVASTATIN CALCIUM 10 MG/1
10 TABLET, FILM COATED ORAL DAILY
Qty: 30 TABLET | Refills: 0 | Status: SHIPPED | OUTPATIENT
Start: 2020-12-23 | End: 2021-01-05 | Stop reason: SDUPTHER

## 2020-12-23 NOTE — TELEPHONE ENCOUNTER
Last OV 1/10/20  Please advise  Thank you      Ref.  Range 6/24/2019 09:58   Cholesterol, Total Latest Ref Range: 0 - 199 mg/dL 137   HDL Cholesterol Latest Ref Range: 40 - 60 mg/dL 51   LDL Calculated Latest Ref Range: <100 mg/dL 62   Triglycerides Latest Ref Range: 0 - 150 mg/dL 121   VLDL Cholesterol Calculated Latest Ref Range: Not Established mg/dL 24

## 2021-01-04 ENCOUNTER — HOSPITAL ENCOUNTER (OUTPATIENT)
Age: 71
Discharge: HOME | End: 2021-01-04
Payer: MEDICARE

## 2021-01-04 DIAGNOSIS — E78.00 PURE HYPERCHOLESTEROLEMIA: ICD-10-CM

## 2021-01-04 DIAGNOSIS — R73.03 PRE-DIABETES: ICD-10-CM

## 2021-01-04 LAB
A/G RATIO: 1.9 (ref 1.1–2.2)
ALBUMIN SERPL-MCNC: 4.2 G/DL (ref 3.4–5)
ALP BLD-CCNC: 92 U/L (ref 40–129)
ALT SERPL-CCNC: 27 U/L (ref 10–40)
ANION GAP SERPL CALCULATED.3IONS-SCNC: 12 MMOL/L (ref 3–16)
AST SERPL-CCNC: 25 U/L (ref 15–37)
BILIRUB SERPL-MCNC: 1.8 MG/DL (ref 0–1)
BUN BLDV-MCNC: 14 MG/DL (ref 7–20)
CALCIUM SERPL-MCNC: 8.9 MG/DL (ref 8.3–10.6)
CHLORIDE BLD-SCNC: 103 MMOL/L (ref 99–110)
CHOLESTEROL, TOTAL: 147 MG/DL (ref 0–199)
CO2: 26 MMOL/L (ref 21–32)
CREAT SERPL-MCNC: <0.5 MG/DL (ref 0.6–1.2)
CREATININE URINE: 31.5 MG/DL (ref 28–259)
ESTIMATED AVERAGE GLUCOSE: 134.1 MG/DL
GFR AFRICAN AMERICAN: >60
GFR NON-AFRICAN AMERICAN: >60
GLOBULIN: 2.2 G/DL
GLUCOSE BLD-MCNC: 125 MG/DL (ref 70–99)
HBA1C MFR BLD: 6.3 %
HDLC SERPL-MCNC: 57 MG/DL (ref 40–60)
LDL CHOLESTEROL CALCULATED: 67 MG/DL
MICROALBUMIN UR-MCNC: <1.2 MG/DL
MICROALBUMIN/CREAT UR-RTO: NORMAL MG/G (ref 0–30)
POTASSIUM SERPL-SCNC: 4 MMOL/L (ref 3.5–5.1)
SODIUM BLD-SCNC: 141 MMOL/L (ref 136–145)
TOTAL PROTEIN: 6.4 G/DL (ref 6.4–8.2)
TRIGL SERPL-MCNC: 113 MG/DL (ref 0–150)
TSH REFLEX: 2.62 UIU/ML (ref 0.27–4.2)
VLDLC SERPL CALC-MCNC: 23 MG/DL

## 2021-01-04 PROCEDURE — 36415 COLL VENOUS BLD VENIPUNCTURE: CPT

## 2021-01-04 PROCEDURE — 80053 COMPREHEN METABOLIC PANEL: CPT

## 2021-01-04 PROCEDURE — 82570 ASSAY OF URINE CREATININE: CPT

## 2021-01-04 PROCEDURE — 83036 HEMOGLOBIN GLYCOSYLATED A1C: CPT

## 2021-01-04 PROCEDURE — 84443 ASSAY THYROID STIM HORMONE: CPT

## 2021-01-04 PROCEDURE — 82043 UR ALBUMIN QUANTITATIVE: CPT

## 2021-01-04 PROCEDURE — 80061 LIPID PANEL: CPT

## 2021-01-04 NOTE — PROGRESS NOTES
2021    TELEHEALTH EVALUATION -- Audio/Visual (During SFYJP-46 public health emergency)    CHANGED TO phone visit due to lack of access. She was notified that medicare may not cover phone visit. HPI:    Satish Storm (:  1950) has requested an audio/video evaluation for the following concern(s):    The primary encounter diagnosis was Pre-diabetes. 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 Stressed with dad passing away at 95 of \"old age\". Sister  from breast cancer. Is spending time with her mom in [place]. Will be gone for 2 mos and will not be able to have mammogram done before she comes back in March    Prediabetes:  Eats pretty well most of the time. Is not exercising much but plans to start again. No polyuria, polydipsia, blurred vision. Hyperlipidemia:  No new myalgias or GI upset on atorvastatin (Lipitor). Medication compliance: compliant most of the time. Patient is  following a low fat, low cholesterol diet. She is not exercising regularly.      Lab Results   Component Value Date    CHOL 147 2021    TRIG 113 2021    HDL 57 2021    LDLCALC 67 2021    LDLDIRECT 119 (H) 2018     Lab Results   Component Value Date    ALT 27 2021    AST 25 2021        Labs Renal Latest Ref Rng & Units 2018   BUN 7 - 20 mg/dL 14 15 19 14 12   Cr 0.6 - 1.2 mg/dL <0.5(L) <0.5(L) <0.5(L) <0.5(L) <0.5(L)   K 3.5 - 5.1 mmol/L 4.0 3.4(L) 4.0 3.7 4.2   Na 136 - 145 mmol/L 141 142 143 142 142     Lab Results   Component Value Date    LABA1C 6.3 2021     Lab Results   Component Value Date    .1 2021      TSH   Date Value Ref Range Status   2021 2.62 0.27 - 4.20 uIU/mL Final   2020 1.75 0.27 - 4.20 uIU/mL Final   2018 2. 24 0.27 - 4.20 uIU/mL Final         Review of Systems   Constitutional: Negative for fatigue, fever and unexpected weight change. Eyes: Negative for visual disturbance. Respiratory: Negative for cough and shortness of breath. Cardiovascular: Negative for chest pain, palpitations and leg swelling. Gastrointestinal: Negative for diarrhea. Endocrine: Negative for cold intolerance, polydipsia and polyuria. Neurological: Negative for weakness, numbness and headaches. Outpatient Medications Marked as Taking for the 1/5/21 encounter (Virtual Visit) with Estuardo Monte MD   Medication Sig Dispense Refill    atorvastatin (LIPITOR) 10 MG tablet Take 1 tablet by mouth daily 30 tablet 0    canagliflozin (INVOKANA) 300 MG TABS tablet Take 1 tablet by mouth every morning (before breakfast) 90 tablet 0    metFORMIN (GLUCOPHAGE-XR) 500 MG extended release tablet TAKE 1 TABLET BY MOUTH TWICE DAILY 180 tablet 0    aspirin EC 81 MG EC tablet Take 1 tablet by mouth daily 30 tablet 3    loratadine (CLARITIN) 10 MG tablet Take 10 mg by mouth daily.  Calcium Carbonate-Vitamin D (CALCIUM + D PO) Take 1 tablet by mouth daily.  Ascorbic Acid (VITAMIN C) 500 MG tablet Take 500 mg by mouth daily.             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

## 2021-01-05 ENCOUNTER — VIRTUAL VISIT (OUTPATIENT)
Dept: FAMILY MEDICINE CLINIC | Age: 71
End: 2021-01-05
Payer: MEDICARE

## 2021-01-05 DIAGNOSIS — Z12.31 BREAST CANCER SCREENING BY MAMMOGRAM: ICD-10-CM

## 2021-01-05 DIAGNOSIS — E11.3293 MILD NONPROLIFERATIVE DIABETIC RETINOPATHY OF BOTH EYES WITHOUT MACULAR EDEMA ASSOCIATED WITH TYPE 2 DIABETES MELLITUS (HCC): ICD-10-CM

## 2021-01-05 DIAGNOSIS — R73.03 PRE-DIABETES: Primary | ICD-10-CM

## 2021-01-05 DIAGNOSIS — E78.00 PURE HYPERCHOLESTEROLEMIA: ICD-10-CM

## 2021-01-05 PROCEDURE — 99443 PR PHYS/QHP TELEPHONE EVALUATION 21-30 MIN: CPT | Performed by: FAMILY MEDICINE

## 2021-01-05 RX ORDER — ATORVASTATIN CALCIUM 10 MG/1
10 TABLET, FILM COATED ORAL DAILY
Qty: 90 TABLET | Refills: 1 | Status: SHIPPED | OUTPATIENT
Start: 2021-01-05 | End: 2021-07-16

## 2021-01-05 SDOH — ECONOMIC STABILITY: INCOME INSECURITY: HOW HARD IS IT FOR YOU TO PAY FOR THE VERY BASICS LIKE FOOD, HOUSING, MEDICAL CARE, AND HEATING?: NOT HARD AT ALL

## 2021-01-05 SDOH — ECONOMIC STABILITY: FOOD INSECURITY: WITHIN THE PAST 12 MONTHS, THE FOOD YOU BOUGHT JUST DIDN'T LAST AND YOU DIDN'T HAVE MONEY TO GET MORE.: NEVER TRUE

## 2021-01-05 SDOH — ECONOMIC STABILITY: TRANSPORTATION INSECURITY
IN THE PAST 12 MONTHS, HAS LACK OF TRANSPORTATION KEPT YOU FROM MEETINGS, WORK, OR FROM GETTING THINGS NEEDED FOR DAILY LIVING?: NOT ASKED

## 2021-01-05 SDOH — ECONOMIC STABILITY: TRANSPORTATION INSECURITY
IN THE PAST 12 MONTHS, HAS THE LACK OF TRANSPORTATION KEPT YOU FROM MEDICAL APPOINTMENTS OR FROM GETTING MEDICATIONS?: NOT ASKED

## 2021-01-05 SDOH — ECONOMIC STABILITY: FOOD INSECURITY: WITHIN THE PAST 12 MONTHS, YOU WORRIED THAT YOUR FOOD WOULD RUN OUT BEFORE YOU GOT MONEY TO BUY MORE.: NEVER TRUE

## 2021-01-05 ASSESSMENT — ENCOUNTER SYMPTOMS
SHORTNESS OF BREATH: 0
DIARRHEA: 0
COUGH: 0

## 2021-01-05 ASSESSMENT — PATIENT HEALTH QUESTIONNAIRE - PHQ9
1. LITTLE INTEREST OR PLEASURE IN DOING THINGS: 0
SUM OF ALL RESPONSES TO PHQ9 QUESTIONS 1 & 2: 0
2. FEELING DOWN, DEPRESSED OR HOPELESS: 0

## 2021-04-08 ENCOUNTER — TELEPHONE (OUTPATIENT)
Dept: FAMILY MEDICINE CLINIC | Age: 71
End: 2021-04-08

## 2021-04-08 DIAGNOSIS — E11.9 TYPE 2 DIABETES MELLITUS WITHOUT COMPLICATION, WITHOUT LONG-TERM CURRENT USE OF INSULIN (HCC): ICD-10-CM

## 2021-04-08 RX ORDER — METFORMIN HYDROCHLORIDE 500 MG/1
TABLET, EXTENDED RELEASE ORAL
Qty: 180 TABLET | Refills: 0 | Status: SHIPPED | OUTPATIENT
Start: 2021-04-08 | End: 2021-07-16

## 2021-04-08 NOTE — TELEPHONE ENCOUNTER
Last OV: 1/05/2021  Please advise  Thank you     Ref.  Range 1/4/2021 09:06   Glucose Latest Ref Range: 70 - 99 mg/dL 125 (H)   Hemoglobin A1C Latest Ref Range: See comment % 6.3   eAG (mg/dL) Latest Units: mg/dL 134.1

## 2021-04-08 NOTE — TELEPHONE ENCOUNTER
Per patient request, please refill:    metFORMIN (GLUCOPHAGE-XR) 500 MG extended release tablet [[id number]]     Order Details  Dose, Route, Frequency: As Directed   Dispense Quantity: 180 tablet Refills: 0          Sig: TAKE 1 TABLET BY MOUTH TWICE DAILY         Start Date: 07/10/20 End Date: --   Written Date: 07/10/20 Expiration Date: 07/10/21       Diagnosis Association: Type 2 diabetes mellitus without complication, without long-term current use of insulin ([facility] (E11.9)   Original Order:  metFORMIN (GLUCOPHAGE-XR) 500 MG extended release tablet [[id number]]   Providers    Authorizing Provider: Yasmin Clay MD NPI: [id number]   Ordering User:  Yasmin Clay MD        Pharmacy    [place] [id number] [id number] [phone] - [phone] verified.    Last OV: 1/05/2021  Last labs: 1/04/2021

## 2021-05-03 LAB — DIABETIC RETINOPATHY: NEGATIVE

## 2021-07-06 ENCOUNTER — TELEPHONE (OUTPATIENT)
Dept: FAMILY MEDICINE CLINIC | Age: 71
End: 2021-07-06

## 2021-07-06 DIAGNOSIS — E78.00 PURE HYPERCHOLESTEROLEMIA: ICD-10-CM

## 2021-07-06 DIAGNOSIS — R73.03 PRE-DIABETES: Primary | ICD-10-CM

## 2021-07-06 DIAGNOSIS — G60.9 HEREDITARY AND IDIOPATHIC PERIPHERAL NEUROPATHY: ICD-10-CM

## 2021-07-06 NOTE — TELEPHONE ENCOUNTER
----- Message from Norma Dell sent at 7/6/2021 12:52 PM EDT -----  Subject: Referral Request    QUESTIONS   Reason for referral request? Pt is requesting bloodwork to be completed   before her appointment. She would like to be called when its in the system   to know when she can go,   Has the physician seen you for this condition before? No   Preferred Specialist (if applicable)? Do you already have an appointment scheduled? Yes  Select Scheduled Date? 2021-08-11  Select Scheduled Physician? Additional Information for Provider?   ---------------------------------------------------------------------------  --------------  CALL BACK INFO  What is the best way for the office to contact you? OK to leave message on   voicemail  Preferred Call Back Phone Number?  [phone]

## 2021-08-09 ENCOUNTER — HOSPITAL ENCOUNTER (OUTPATIENT)
Age: 71
Discharge: HOME | End: 2021-08-09
Payer: MEDICARE

## 2021-08-09 DIAGNOSIS — G60.9 HEREDITARY AND IDIOPATHIC PERIPHERAL NEUROPATHY: ICD-10-CM

## 2021-08-09 DIAGNOSIS — E78.00 PURE HYPERCHOLESTEROLEMIA: ICD-10-CM

## 2021-08-09 DIAGNOSIS — R73.03 PRE-DIABETES: ICD-10-CM

## 2021-08-09 LAB
A/G RATIO: 1.7 (ref 1.1–2.2)
ALBUMIN SERPL-MCNC: 4.2 G/DL (ref 3.4–5)
ALP BLD-CCNC: 83 U/L (ref 40–129)
ALT SERPL-CCNC: 35 U/L (ref 10–40)
ANION GAP SERPL CALCULATED.3IONS-SCNC: 12 MMOL/L (ref 3–16)
AST SERPL-CCNC: 31 U/L (ref 15–37)
BILIRUB SERPL-MCNC: 2.2 MG/DL (ref 0–1)
BUN BLDV-MCNC: 14 MG/DL (ref 7–20)
CALCIUM SERPL-MCNC: 8.8 MG/DL (ref 8.3–10.6)
CHLORIDE BLD-SCNC: 103 MMOL/L (ref 99–110)
CHOLESTEROL, TOTAL: 142 MG/DL (ref 0–199)
CO2: 24 MMOL/L (ref 21–32)
CREAT SERPL-MCNC: <0.5 MG/DL (ref 0.6–1.2)
GFR AFRICAN AMERICAN: >60
GFR NON-AFRICAN AMERICAN: >60
GLOBULIN: 2.5 G/DL
GLUCOSE BLD-MCNC: 204 MG/DL (ref 70–99)
HCT VFR BLD CALC: 44 % (ref 36–48)
HDLC SERPL-MCNC: 43 MG/DL (ref 40–60)
HEMOGLOBIN: 15.8 G/DL (ref 12–16)
LDL CHOLESTEROL CALCULATED: 51 MG/DL
MCH RBC QN AUTO: 34.6 PG (ref 26–34)
MCHC RBC AUTO-ENTMCNC: 35.9 G/DL (ref 31–36)
MCV RBC AUTO: 96.5 FL (ref 80–100)
PDW BLD-RTO: 12.5 % (ref 12.4–15.4)
PLATELET # BLD: 185 K/UL (ref 135–450)
PMV BLD AUTO: 7.9 FL (ref 5–10.5)
POTASSIUM SERPL-SCNC: 4 MMOL/L (ref 3.5–5.1)
RBC # BLD: 4.56 M/UL (ref 4–5.2)
SODIUM BLD-SCNC: 139 MMOL/L (ref 136–145)
TOTAL PROTEIN: 6.7 G/DL (ref 6.4–8.2)
TRIGL SERPL-MCNC: 242 MG/DL (ref 0–150)
TSH REFLEX: 2.25 UIU/ML (ref 0.27–4.2)
VLDLC SERPL CALC-MCNC: 48 MG/DL
WBC # BLD: 6.1 K/UL (ref 4–11)

## 2021-08-09 PROCEDURE — 85027 COMPLETE CBC AUTOMATED: CPT

## 2021-08-09 PROCEDURE — 84443 ASSAY THYROID STIM HORMONE: CPT

## 2021-08-09 PROCEDURE — 80053 COMPREHEN METABOLIC PANEL: CPT

## 2021-08-09 PROCEDURE — 83036 HEMOGLOBIN GLYCOSYLATED A1C: CPT

## 2021-08-09 PROCEDURE — 36415 COLL VENOUS BLD VENIPUNCTURE: CPT

## 2021-08-09 PROCEDURE — 80061 LIPID PANEL: CPT

## 2021-08-10 PROBLEM — E11.9 TYPE 2 DIABETES MELLITUS, WITHOUT LONG-TERM CURRENT USE OF INSULIN (HCC): Status: RESOLVED | Noted: 2018-07-15 | Resolved: 2021-08-10

## 2021-08-10 PROBLEM — R73.03 PRE-DIABETES: Status: RESOLVED | Noted: 2019-06-25 | Resolved: 2021-08-10

## 2021-08-10 LAB
ESTIMATED AVERAGE GLUCOSE: 157.1 MG/DL
HBA1C MFR BLD: 7.1 %

## 2021-08-10 NOTE — PROGRESS NOTES
Subjective:      Patient ID: Eduardo Rutledge 70 y.o.        HPI  Rico Ying is here for follow up for DM, HTN and hyperlipidemia. DM control has worsened; had improved to pre-DM now [email or web address] [place] is increased again. Stress with alternating with her sister caring for her mom. She goes to her Mom's in [place] 8-10 weeks at a time. Her Mom has dementia . Is 81 yo. Treatment Adherence:   Medication compliance:  compliant most of the time  Diet compliance:  compliant most of the time  Weight trend: stable  Current exercise: no regular exercise  Barriers: time constraints; cannot leave her mom alone when with her  Twisted her foot a few weeks ago and still has pain in the 4th toe and little bit in the lateral ankle. Has appt with ortho tomorrow. Diabetes Mellitus Type 2: Current symptoms/problems include none. Home blood sugar records: patient does not test  Any episodes of hypoglycemia? no  Eye exam current (within one year): yes  Tobacco history: She  reports that she has never smoked. She has never used smokeless tobacco.   Daily Aspirin? Yes    Hypertension:  Home blood pressure monitoring: No.  She is not adherent to a low sodium diet. Patient denies chest pain, shortness of breath, headache, lightheadedness, peripheral edema and palpitations. Antihypertensive medication side effects: no medication side effects noted. Use of agents associated with hypertension: none. Hyperlipidemia:  No new myalgias or GI upset on atorvastatin (Lipitor).        Lab Results   Component Value Date    LABA1C 7.1 08/09/2021    LABA1C 6.3 01/04/2021    LABA1C 5.9 01/08/2020     Lab Results   Component Value Date    LABMICR <1.20 01/04/2021    CREATININE <0.5 (L) 08/09/2021     Lab Results   Component Value Date    ALT 35 08/09/2021    AST 31 08/09/2021     Lab Results   Component Value Date    CHOL 142 08/09/2021    TRIG 242 (H) 08/09/2021    HDL 43 08/09/2021    LDLCALC 51 08/09/2021    LDLDIRECT 119 (H) 07/13/2018         Outpatient Medications Marked as Taking for the 8/11/21 encounter (Office Visit) with Efe Gomez MD   Medication Sig Dispense Refill    metFORMIN (GLUCOPHAGE-XR) 500 MG extended release tablet TAKE 1 TABLET BY MOUTH TWICE DAILY 180 tablet 1    atorvastatin (LIPITOR) 10 MG tablet TAKE 1 TABLET BY MOUTH DAILY 90 tablet 1    aspirin EC 81 MG EC tablet Take 1 tablet by mouth daily 30 tablet 3    loratadine (CLARITIN) 10 MG tablet Take 10 mg by mouth daily.  Calcium Carbonate-Vitamin D (CALCIUM + D PO) Take 1 tablet by mouth daily.  Ascorbic Acid (VITAMIN C) 500 MG tablet Take 500 mg by mouth daily. Allergies   Allergen Reactions    Naprosyn [Naproxen] Rash        Patient Active Problem List   Diagnosis    Allergic rhinitis    Migraine without aura    Insomnia    Hereditary and idiopathic peripheral neuropathy    Rosacea    Edema    Pure hypercholesterolemia    Diabetes mellitus with both eyes affected by mild nonproliferative retinopathy without macular edema ([address]        Past Medical History:   Diagnosis Date    Calculus of kidney 5/2/2011    Contusion of right hand 9/9/2015    Rib contusion 9/9/2015    Scoliosis (and kyphoscoliosis), idiopathic 5/2/2011    Strain of thoracic region 9/9/2015    Type 2 diabetes mellitus without complication, without long-term current use of insulin ([address] 7/15/2018    Unspecified tinnitus 5/2/2011       Past Surgical History:   Procedure Laterality Date    CHOLECYSTECTOMY      MARTHA AND BSO          Social History     Tobacco Use    Smoking status: Never Smoker    Smokeless tobacco: Never Used   Substance Use Topics    Alcohol use: Yes     Comment: occ    Drug use: No        Family History   Problem Relation Age of Onset    Diabetes Mother [de-identified]    Osteoporosis Mother     Other Mother 80        a-fib    Breast Cancer Sister 46        BRAC negative.         Review of Systems  Review of Systems    Objective: Physical Exam.  Wt Readings from Last 3 Encounters:   08/11/21 162 lb 6.4 oz (73.7 kg)   11/24/20 160 lb (72.6 kg)   01/10/20 163 lb 6.4 oz (74.1 kg)     Temp Readings from Last 3 Encounters:   08/11/21 97.1 °F (36.2 °C) (Temporal)   11/24/20 97.5 °F (36.4 °C) (Temporal)   01/10/20 97.8 °F (36.6 °C) (Oral)     BP Readings from Last 3 Encounters:   08/11/21 130/88   11/24/20 127/75   01/10/20 128/76     Pulse Readings from Last 3 Encounters:   08/11/21 80   11/24/20 78   01/10/20 65      NAD   Skin is warm and dry. Well hydrated. No rash. Mood +, affect is normal.   The neck is supple and free of adenopathy or masses, the thyroid is normal without enlargement or nodules. Chest: clear with no wheezes or rales. No retractions, or use of accessory muscles noted. Cardiovascular: PMI is not displaced, and no thrill noted. Regular rate and rhythm with no rub, murmur or gallop. No peripheral edema. The abdomen is soft without tenderness, guarding, mass, rebound or organomegaly. Aorta, femoral, DP and PT pulses intact. Sensation normal to monofilament feet bilaterally. Feet in good repair, without significant callouses and without ulceration or deformity. Assessment / Plan:        Diagnosis Orders   1. Routine general medical examination at a health care facility     2. Type 2 diabetes mellitus with both eyes affected by mild nonproliferative retinopathy without macular edema, without long-term current use of insulin (HCC)  Basic Metabolic Panel  Discussed diet, exercise and weight loss strategies ]  Options addressed  She prefers to work on diet and exercise. Hemoglobin A1C    HM DIABETES FOOT EXAM   3. Pure hypercholesterolemia  LDL at goal < 100   4. Estrogen deficiency  DEXA BONE DENSITY 2 SITES   5. Presbycusis of both ears  Wyatt Salinas, Audiology, [facility]   6. Encounter for screening mammogram for breast cancer  MAIA Digital Screen Bilateral [[id number]]   7. Sun-damaged skin  External Referral To Dermatology       Medicare Annual Wellness Visit  Name: Adali Bowen Date: 2021   MRN: [id number] Sex: Female   Age: 70 y.o. Ethnicity: Non- / Non    : 1950 Race: White (non-)      Izabel Wright is here for Medicare AWV    Screenings for behavioral, psychosocial and functional/safety risks, and cognitive dysfunction are all negative except as indicated below. These results, as well as other patient data from the [address] form, are documented in Flowsheets linked to this Encounter. Allergies   Allergen Reactions    Naprosyn [Naproxen] Rash     Outpatient Medications Marked as Taking for the 21 encounter (Office Visit) with Marybel Najera MD   Medication Sig Dispense Refill    metFORMIN (GLUCOPHAGE-XR) 500 MG extended release tablet TAKE 1 TABLET BY MOUTH TWICE DAILY 180 tablet 1    atorvastatin (LIPITOR) 10 MG tablet TAKE 1 TABLET BY MOUTH DAILY 90 tablet 1    aspirin EC 81 MG EC tablet Take 1 tablet by mouth daily 30 tablet 3    loratadine (CLARITIN) 10 MG tablet Take 10 mg by mouth daily.  Calcium Carbonate-Vitamin D (CALCIUM + D PO) Take 1 tablet by mouth daily.  Ascorbic Acid (VITAMIN C) 500 MG tablet Take 500 mg by mouth daily. Past Medical History:   Diagnosis Date    Calculus of kidney 2011    Contusion of right hand 2015    Rib contusion 2015    Scoliosis (and kyphoscoliosis), idiopathic 2011    Strain of thoracic region 2015    Type 2 diabetes mellitus without complication, without long-term current use of insulin ([address] 7/15/2018    Unspecified tinnitus 2011       Past Surgical History:   Procedure Laterality Date    CHOLECYSTECTOMY      MARTHA AND BSO         Family History   Problem Relation Age of Onset    Diabetes Mother [de-identified]    Osteoporosis Mother     Other Mother 80        a-fib    Breast Cancer Sister 46        BRAC negative. CareTeam (Including outside providers/suppliers regularly involved in providing care):   Patient Care Team:  Vilma Dela Cruz MD as PCP - Kylie Richards MD as PCP - [facility] Empaneled Provider  Dashawn Dale MD as Consulting Physician (Otolaryngology)    Wt Readings from Last 3 Encounters:   08/11/21 162 lb 6.4 oz (73.7 kg)   11/24/20 160 lb (72.6 kg)   01/10/20 163 lb 6.4 oz (74.1 kg)     Vitals:    08/11/21 1007   BP: 130/88   Pulse: 80   Resp: 13   Temp: 97.1 °F (36.2 °C)   TempSrc: Temporal   SpO2: 97%   Weight: 162 lb 6.4 oz (73.7 kg)   Height: 5' 2\" (1.575 m)     Body mass index is 29.7 kg/m². Based upon direct observation of the patient, evaluation of cognition reveals recent and remote memory intact.     General Appearance: alert and oriented to person, place and time, well developed and well- nourished, in no acute distress  Skin: warm and dry, no rash or erythema  Head: normocephalic and atraumatic  Eyes: pupils equal, round, and reactive to light, extraocular eye movements intact, conjunctivae normal  ENT: tympanic membrane, external ear and ear canal normal bilaterally, nose without deformity, nasal mucosa and turbinates normal without polyps  Neck: supple and non-tender without mass, no thyromegaly or thyroid nodules, no cervical lymphadenopathy  Pulmonary/Chest: clear to auscultation bilaterally- no wheezes, rales or rhonchi, normal air movement, no respiratory distress  Cardiovascular: normal rate, regular rhythm, normal S1 and S2, no murmurs, rubs, clicks, or gallops, distal pulses intact, no carotid bruits  Abdomen: soft, non-tender, non-distended, normal bowel sounds, no masses or organomegaly  Breast: appear normal, no suspicious masses, no skin or nipple changes or axillary nodes  Extremities: no cyanosis, clubbing or edema  Musculoskeletal: normal range of motion, no joint swelling, deformity or tenderness  Neurologic: reflexes normal and symmetric, no cranial nerve deficit, gait, coordination and speech normal    Patient's complete Health Risk Assessment and screening values have been reviewed and are found in Flowsheets. The following problems were reviewed today and where indicated follow up appointments were made and/or referrals ordered. Positive Risk Factor Screenings with Interventions:     Fall Risk:  2 or more falls in past year?: no  Fall with injury in past year?: (!) yes  Fall Risk Interventions:    · has appt with ortho         General Health and ACP:  General  In general, how would you say your health is?: Very Good  In the past 7 days, have you experienced any of the following? New or Increased Pain, New or Increased Fatigue, Loneliness, Social Isolation, Stress or Anger?: None of These  Do you get the social and emotional support that you need?: Yes  Do you have a Living Will?: Yes  Advance Directives     Power of [address] [facility] Will ACP-Advance Directive ACP-Power of     Not on File Not on File Not on File Not on File      General Health Risk Interventions:  · silver sneakers    Health Habits/Nutrition:  Health Habits/Nutrition  Do you exercise for at least 20 minutes 2-3 times per week?: (!) No  Have you lost any weight without trying in the past 3 months?: No  Do you eat only one meal per day?: No  Have you seen the dentist within the past year?: Yes  Body mass index: (!) 29.7  Health Habits/Nutrition Interventions:  · referred to on line Silver Sneakers class    Hearing/Vision:  No exam data present  Hearing/Vision  Do you or your family notice any trouble with your hearing that hasn't been managed with hearing aids?: (!) Yes  Do you have difficulty driving, watching TV, or doing any of your daily activities because of your eyesight?: No  Have you had an eye exam within the past year?: Yes  Hearing/Vision Interventions:  · Hearing concerns:  had hearing test this year.      Safety:  Safety  Do you have working smoke detectors?: Yes  Have all throw rugs been removed or fastened?: Yes  Do you have non-slip mats or surfaces in all bathtubs/showers?: (!) No  Do all of your stairways have a railing or banister?: Yes  Are your doorways, halls and stairs free of clutter?: Yes  Do you always fasten your seatbelt when you are in a car?: Yes  Safety Interventions:  · Home safety tips provided   · Has non-slip in the shower. Personalized Preventive Plan   Current Health Maintenance Status  Immunization History   Administered Date(s) Administered    COVID-19, Pfizer, PF, 30mcg/0.3mL 03/30/2021, 04/27/2021    Influenza Virus Vaccine 10/28/2011    Influenza Whole 09/08/2009    Influenza, High Dose (Fluzone 65 yrs and older) 12/21/2018    Influenza, MDCK Quadv, IM, PF (Flucelvax 4 yrs and older) 10/10/2020    Pneumococcal Conjugate 13-valent ([id number]) 07/12/2016    Pneumococcal Polysaccharide ([id number]) 07/13/2018    Tdap (Boostrix, Adacel) 06/26/2007, 12/21/2018    Zoster Live (Zostavax) 06/06/2011    Zoster Recombinant (Shingrix) 07/13/2018, 01/28/2019        Health Maintenance   Topic Date Due    Annual Wellness Visit (AWV)  Never done    Diabetic foot exam  06/25/2020    Breast cancer screen  06/25/2020    Flu vaccine (1) 09/01/2021    Diabetic microalbuminuria test  01/04/2022    A1C test (Diabetic or Prediabetic)  08/09/2022    Lipid screen  08/09/2022    Diabetic retinal exam  05/03/2023    DEXA (modify frequency per FRAX score)  12/20/2023    Colon cancer screen colonoscopy  10/13/2024    DTaP/Tdap/Td vaccine (3 - Td or Tdap) 12/21/2028    Shingles Vaccine  Completed    Pneumococcal 65+ years Vaccine  Completed    COVID-19 Vaccine  Completed    Hepatitis C screen  Completed    Hepatitis A vaccine  Aged Out    Hib vaccine  Aged Out    Meningococcal (ACWY) vaccine  Aged Out     Recommendations for [employer] Due: see orders and patient instructions/AVS.  .   Recommended screening schedule for the next 5-10 years is provided to the patient in written form: see Patient Instructions/AVS.    Tobias Marina was seen today for medicare awv. Diagnoses and all orders for this visit:    Type 2 diabetes mellitus with both eyes affected by mild nonproliferative retinopathy without macular edema, without long-term current use of insulin (HCC)    Pure hypercholesterolemia    Estrogen deficiency    Presbycusis of both ears                 Advance Care Planning   Advanced Care Planning: Discussed the patients choices for care and treatment in case of a health event that adversely affects decision-making abilities. Also discussed the patients long-term treatment options. Reviewed with the patient the [address] [place] [address] of [address] [place] [address] Declaration forms  Reviewed the process of designating a competent adult as an Agent (or -in-fact) that could take make health care decisions for the patient if incompetent. Patient was asked to complete the declaration forms, either acknowledge the forms by a public notary or an eligible witness and provide a signed copy to the practice office. Time spent (minutes): will provide copy LW and DPOA. 5 min    Cardiovascular Disease Risk Counseling: Assessed the patient's risk to develop cardiovascular disease and reviewed main risk factors. Reviewed steps to reduce disease risk including:   · Quitting tobacco use, reducing amount smoked, or not starting the habit  · Making healthy food choices  · Being physically active and gradualy increasing activity levels   · Reduce weight and determine a healthy BMI goal  · Monitor blood pressure and treat if higher than 140/90 mmHg  · Maintain blood total cholesterol levels under 5 mmol/l or 190 mg/dl  · Maintain LDL cholesterol levels under 3.0 mmol/l or 115 mg/dl   · Control blood glucose levels  · Consider taking aspirin (75 mg daily), once blood pressure is controlled   Provided a follow up plan.   Time spent (minutes): 5 minutes

## 2021-08-11 ENCOUNTER — OFFICE VISIT (OUTPATIENT)
Dept: FAMILY MEDICINE CLINIC | Age: 71
End: 2021-08-11
Payer: MEDICARE

## 2021-08-11 VITALS
WEIGHT: 162.4 LBS | BODY MASS INDEX: 29.88 KG/M2 | HEIGHT: 62 IN | SYSTOLIC BLOOD PRESSURE: 122 MMHG | OXYGEN SATURATION: 97 % | DIASTOLIC BLOOD PRESSURE: 78 MMHG | RESPIRATION RATE: 13 BRPM | HEART RATE: 80 BPM | TEMPERATURE: 97.1 F

## 2021-08-11 DIAGNOSIS — E78.00 PURE HYPERCHOLESTEROLEMIA: ICD-10-CM

## 2021-08-11 DIAGNOSIS — L57.8 SUN-DAMAGED SKIN: ICD-10-CM

## 2021-08-11 DIAGNOSIS — H91.13 PRESBYCUSIS OF BOTH EARS: ICD-10-CM

## 2021-08-11 DIAGNOSIS — Z00.00 ROUTINE GENERAL MEDICAL EXAMINATION AT A HEALTH CARE FACILITY: Primary | ICD-10-CM

## 2021-08-11 DIAGNOSIS — Z12.31 ENCOUNTER FOR SCREENING MAMMOGRAM FOR BREAST CANCER: ICD-10-CM

## 2021-08-11 DIAGNOSIS — E11.3293 TYPE 2 DIABETES MELLITUS WITH BOTH EYES AFFECTED BY MILD NONPROLIFERATIVE RETINOPATHY WITHOUT MACULAR EDEMA, WITHOUT LONG-TERM CURRENT USE OF INSULIN (HCC): ICD-10-CM

## 2021-08-11 DIAGNOSIS — E28.39 ESTROGEN DEFICIENCY: ICD-10-CM

## 2021-08-11 PROCEDURE — G8417 CALC BMI ABV UP PARAM F/U: HCPCS | Performed by: FAMILY MEDICINE

## 2021-08-11 PROCEDURE — 1090F PRES/ABSN URINE INCON ASSESS: CPT | Performed by: FAMILY MEDICINE

## 2021-08-11 PROCEDURE — 1036F TOBACCO NON-USER: CPT | Performed by: FAMILY MEDICINE

## 2021-08-11 PROCEDURE — 3051F HG A1C>EQUAL 7.0%<8.0%: CPT | Performed by: FAMILY MEDICINE

## 2021-08-11 PROCEDURE — 99214 OFFICE O/P EST MOD 30 MIN: CPT | Performed by: FAMILY MEDICINE

## 2021-08-11 PROCEDURE — 3017F COLORECTAL CA SCREEN DOC REV: CPT | Performed by: FAMILY MEDICINE

## 2021-08-11 PROCEDURE — G8427 DOCREV CUR MEDS BY ELIG CLIN: HCPCS | Performed by: FAMILY MEDICINE

## 2021-08-11 PROCEDURE — G0438 PPPS, INITIAL VISIT: HCPCS | Performed by: FAMILY MEDICINE

## 2021-08-11 PROCEDURE — 2022F DILAT RTA XM EVC RTNOPTHY: CPT | Performed by: FAMILY MEDICINE

## 2021-08-11 PROCEDURE — G8399 PT W/DXA RESULTS DOCUMENT: HCPCS | Performed by: FAMILY MEDICINE

## 2021-08-11 PROCEDURE — 4040F PNEUMOC VAC/ADMIN/RCVD: CPT | Performed by: FAMILY MEDICINE

## 2021-08-11 PROCEDURE — 1123F ACP DISCUSS/DSCN MKR DOCD: CPT | Performed by: FAMILY MEDICINE

## 2021-08-11 ASSESSMENT — LIFESTYLE VARIABLES
AUDIT-C TOTAL SCORE: 4
HAS A RELATIVE, FRIEND, DOCTOR, OR ANOTHER HEALTH PROFESSIONAL EXPRESSED CONCERN ABOUT YOUR DRINKING OR SUGGESTED YOU CUT DOWN: 0
HOW OFTEN DURING THE LAST YEAR HAVE YOU BEEN UNABLE TO REMEMBER WHAT HAPPENED THE NIGHT BEFORE BECAUSE YOU HAD BEEN DRINKING: NEVER
HOW OFTEN DURING THE LAST YEAR HAVE YOU FOUND THAT YOU WERE NOT ABLE TO STOP DRINKING ONCE YOU HAD STARTED: NEVER
HOW OFTEN DURING THE LAST YEAR HAVE YOU NEEDED AN ALCOHOLIC DRINK FIRST THING IN THE MORNING TO GET YOURSELF GOING AFTER A NIGHT OF HEAVY DRINKING: 0
HOW OFTEN DO YOU HAVE SIX OR MORE DRINKS ON ONE OCCASION: NEVER
HOW OFTEN DURING THE LAST YEAR HAVE YOU HAD A FEELING OF GUILT OR REMORSE AFTER DRINKING: NEVER
HOW MANY STANDARD DRINKS CONTAINING ALCOHOL DO YOU HAVE ON A TYPICAL DAY: 0
HAVE YOU OR SOMEONE ELSE BEEN INJURED AS A RESULT OF YOUR DRINKING: NO
HOW OFTEN DURING THE LAST YEAR HAVE YOU FOUND THAT YOU WERE NOT ABLE TO STOP DRINKING ONCE YOU HAD STARTED: 0
HOW OFTEN DO YOU HAVE SIX OR MORE DRINKS ON ONE OCCASION: 0
HOW OFTEN DO YOU HAVE A DRINK CONTAINING ALCOHOL: 4
HOW OFTEN DURING THE LAST YEAR HAVE YOU FAILED TO DO WHAT WAS NORMALLY EXPECTED FROM YOU BECAUSE OF DRINKING: 0
HOW OFTEN DURING THE LAST YEAR HAVE YOU NEEDED AN ALCOHOLIC DRINK FIRST THING IN THE MORNING TO GET YOURSELF GOING AFTER A NIGHT OF HEAVY DRINKING: NEVER
HOW OFTEN DURING THE LAST YEAR HAVE YOU BEEN UNABLE TO REMEMBER WHAT HAPPENED THE NIGHT BEFORE BECAUSE YOU HAD BEEN DRINKING: 0
HOW OFTEN DURING THE LAST YEAR HAVE YOU HAD A FEELING OF GUILT OR REMORSE AFTER DRINKING: 0
HAVE YOU OR SOMEONE ELSE BEEN INJURED AS A RESULT OF YOUR DRINKING: 0
HAS A RELATIVE, FRIEND, DOCTOR, OR ANOTHER HEALTH PROFESSIONAL EXPRESSED CONCERN ABOUT YOUR DRINKING OR SUGGESTED YOU CUT DOWN: NO
HOW OFTEN DURING THE LAST YEAR HAVE YOU FAILED TO DO WHAT WAS NORMALLY EXPECTED FROM YOU BECAUSE OF DRINKING: NEVER
AUDIT-C TOTAL SCORE: 0
HOW MANY STANDARD DRINKS CONTAINING ALCOHOL DO YOU HAVE ON A TYPICAL DAY: ONE OR TWO
AUDIT TOTAL SCORE: 4
AUDIT TOTAL SCORE: 0
HOW OFTEN DO YOU HAVE A DRINK CONTAINING ALCOHOL: FOUR OR MORE TIMES A WEEK

## 2021-08-11 ASSESSMENT — PATIENT HEALTH QUESTIONNAIRE - PHQ9
SUM OF ALL RESPONSES TO PHQ9 QUESTIONS 1 & 2: 0
2. FEELING DOWN, DEPRESSED OR HOPELESS: 0
SUM OF ALL RESPONSES TO PHQ QUESTIONS 1-9: 0
SUM OF ALL RESPONSES TO PHQ QUESTIONS 1-9: 0
1. LITTLE INTEREST OR PLEASURE IN DOING THINGS: 0
SUM OF ALL RESPONSES TO PHQ QUESTIONS 1-9: 0
SUM OF ALL RESPONSES TO PHQ9 QUESTIONS 1 & 2: 0
1. LITTLE INTEREST OR PLEASURE IN DOING THINGS: 0
SUM OF ALL RESPONSES TO PHQ QUESTIONS 1-9: 0
2. FEELING DOWN, DEPRESSED OR HOPELESS: 0

## 2021-08-11 NOTE — PATIENT INSTRUCTIONS
Personalized Preventive Plan for Satnam Fraction - 8/11/2021  Medicare offers a range of preventive health benefits. Some of the tests and screenings are paid in full while other may be subject to a deductible, co-insurance, and/or copay. Some of these benefits include a comprehensive review of your medical history including lifestyle, illnesses that may run in your family, and various assessments and screenings as appropriate. After reviewing your medical record and screening and assessments performed today your provider may have ordered immunizations, labs, imaging, and/or referrals for you. A list of these orders (if applicable) as well as your Preventive Care list are included within your After Visit Summary for your review. Other Preventive Recommendations:    · A preventive eye exam performed by an eye specialist is recommended every 1-2 years to screen for glaucoma; cataracts, macular degeneration, and other eye disorders. · A preventive dental visit is recommended every 6 months. · Try to get at least 150 minutes of exercise per week or 10,000 steps per day on a pedometer . · Order or download the FREE \"Exercise & Physical Activity: Your Everyday Guide\" from [employer]. Call 6-403.830.3142 or search [employer] online. · You need 1086-7580 mg of calcium and 7587-1599 IU of vitamin D per day. It is possible to meet your calcium requirement with diet alone, but a vitamin D supplement is usually necessary to meet this goal.  · When exposed to the sun, use a sunscreen that protects against both UVA and UVB radiation with an SPF of 30 or greater. Reapply every 2 to 3 hours or after sweating, drying off with a towel, or swimming. · Always wear a seat belt when traveling in a car. Always wear a helmet when riding a bicycle or motorcycle.

## 2021-09-30 ENCOUNTER — PATIENT MESSAGE (OUTPATIENT)
Dept: FAMILY MEDICINE CLINIC | Age: 71
End: 2021-09-30

## 2021-10-18 DIAGNOSIS — E11.9 TYPE 2 DIABETES MELLITUS WITHOUT COMPLICATION, WITHOUT LONG-TERM CURRENT USE OF INSULIN (HCC): ICD-10-CM

## 2021-10-18 DIAGNOSIS — E78.00 PURE HYPERCHOLESTEROLEMIA: ICD-10-CM

## 2021-10-19 RX ORDER — ATORVASTATIN CALCIUM 10 MG/1
10 TABLET, FILM COATED ORAL DAILY
Qty: 90 TABLET | Refills: 1 | Status: SHIPPED | OUTPATIENT
Start: 2021-10-19 | End: 2022-04-13

## 2021-10-19 RX ORDER — METFORMIN HYDROCHLORIDE 500 MG/1
TABLET, EXTENDED RELEASE ORAL
Qty: 180 TABLET | Refills: 1 | Status: SHIPPED | OUTPATIENT
Start: 2021-10-19 | End: 2022-04-13

## 2021-10-19 NOTE — TELEPHONE ENCOUNTER
Requested Prescriptions     Pending Prescriptions Disp Refills    atorvastatin (LIPITOR) 10 MG tablet [Pharmacy Med Name: ATORVASTATIN 10MG TABLETS] 90 tablet 1     Sig: TAKE 1 TABLET BY MOUTH DAILY    metFORMIN (GLUCOPHAGE-XR) 500 MG extended release tablet [Pharmacy Med Name: METFORMIN ER 500MG 24HR TABS] 180 tablet 1     Sig: TAKE 1 TABLET BY MOUTH TWICE DAILY     Last ov 8/11/2021  Last labs 8/9/21

## 2021-12-02 ENCOUNTER — HOSPITAL ENCOUNTER (OUTPATIENT)
Dept: GENERAL RADIOLOGY | Age: 71
Discharge: HOME | End: 2021-12-02
Payer: MEDICARE

## 2021-12-02 ENCOUNTER — HOSPITAL ENCOUNTER (OUTPATIENT)
Age: 71
Discharge: HOME | End: 2021-12-02
Payer: MEDICARE

## 2021-12-02 DIAGNOSIS — E28.39 ESTROGEN DEFICIENCY: ICD-10-CM

## 2021-12-02 DIAGNOSIS — E11.3293 TYPE 2 DIABETES MELLITUS WITH BOTH EYES AFFECTED BY MILD NONPROLIFERATIVE RETINOPATHY WITHOUT MACULAR EDEMA, WITHOUT LONG-TERM CURRENT USE OF INSULIN (HCC): ICD-10-CM

## 2021-12-02 LAB
ANION GAP SERPL CALCULATED.3IONS-SCNC: 18 MMOL/L (ref 3–16)
BUN BLDV-MCNC: 12 MG/DL (ref 7–20)
CALCIUM SERPL-MCNC: 8.9 MG/DL (ref 8.3–10.6)
CHLORIDE BLD-SCNC: 96 MMOL/L (ref 99–110)
CO2: 22 MMOL/L (ref 21–32)
CREAT SERPL-MCNC: <0.5 MG/DL (ref 0.6–1.2)
GFR AFRICAN AMERICAN: >60
GFR NON-AFRICAN AMERICAN: >60
GLUCOSE BLD-MCNC: 289 MG/DL (ref 70–99)
POTASSIUM SERPL-SCNC: 3.7 MMOL/L (ref 3.5–5.1)
SODIUM BLD-SCNC: 136 MMOL/L (ref 136–145)

## 2021-12-02 PROCEDURE — 77080 DXA BONE DENSITY AXIAL: CPT

## 2021-12-02 PROCEDURE — 83036 HEMOGLOBIN GLYCOSYLATED A1C: CPT

## 2021-12-02 PROCEDURE — 80048 BASIC METABOLIC PNL TOTAL CA: CPT

## 2021-12-02 PROCEDURE — 36415 COLL VENOUS BLD VENIPUNCTURE: CPT

## 2021-12-03 LAB
ESTIMATED AVERAGE GLUCOSE: 237.4 MG/DL
HBA1C MFR BLD: 9.9 %

## 2021-12-05 NOTE — PROGRESS NOTES
Subjective:      Patient ID: Avtar Rutledge 70 y.o.        HPI  Genevieve Trevinoist is here for follow up for DM,  hyperlipidemia. Lost her  to [place] in October and her mother to old age the same month. Is sleeping well only when she takes Tylenol PM.  Her appetite is fine. She has a good support system. Is doing ok. She had COVID when her      DUE: COVID booster, flu vaccine, mammogram - has mammogram contact information. Treatment Adherence:   Medication compliance:  compliant most of the time  Diet compliance:  compliant most of the time  She was drinking 2 to 3 bourbon and coke every night; has cut to just one bourbon every night. Is not eating as well as she was. Just added salads every day. Weight trend: stable  Current exercise: walks intermittently. Barriers: plantar fasciitis. Seeing podiatry. Had injections x 2 and a course of steroids. Is better. Diabetes Mellitus Type 2: Current symptoms/problems include blurry vision. She saw optometry. Better with cutting out coke. Home blood sugar records: patient does not test  Any episodes of hypoglycemia? no  Eye exam current (within one year): yes  Tobacco history: She  reports that she has never smoked. She has never used smokeless tobacco.   Daily Aspirin? Yes    Hypertension:  Home blood pressure monitoring: No.  She is adherent to a low sodium diet. Patient denies chest pain, shortness of breath, peripheral edema and palpitations. Antihypertensive medication side effects: no medication side effects noted. Use of agents associated with hypertension: none. Hyperlipidemia:  No new myalgias or GI upset on atorvastatin (Lipitor).        Lab Results   Component Value Date    LABA1C 9.9 12/02/2021    LABA1C 7.1 08/09/2021    LABA1C 6.3 01/04/2021     Lab Results   Component Value Date    LABMICR <1.20 01/04/2021    CREATININE <0.5 (L) 12/02/2021     Lab Results   Component Value Date    ALT 35 08/09/2021    AST 31 08/09/2021     Lab Results   Component Value Date    CHOL 142 08/09/2021    TRIG 242 (H) 08/09/2021    HDL 43 08/09/2021    LDLCALC 51 08/09/2021    LDLDIRECT 119 (H) 07/13/2018         Labs Renal Latest Ref Rng & Units 12/2/2021 8/9/2021 1/4/2021 1/8/2020 6/24/2019   BUN 7 - 20 mg/dL 12 14 14 15 19   Cr 0.6 - 1.2 mg/dL <0.5(L) <0.5(L) <0.5(L) <0.5(L) <0.5(L)   K 3.5 - 5.1 mmol/L 3.7 4.0 4.0 3.4(L) 4.0   Na 136 - 145 mmol/L 136 139 141 142 143     TSH   Date Value Ref Range Status   08/09/2021 2.25 0.27 - 4.20 uIU/mL Final   01/04/2021 2.62 0.27 - 4.20 uIU/mL Final   01/08/2020 1.75 0.27 - 4.20 uIU/mL Final       Outpatient Medications Marked as Taking for the 12/7/21 encounter (Office Visit) with Panda Fink MD   Medication Sig Dispense Refill    Vitamin D, Cholecalciferol, 25 MCG (1000 UT) CAPS Take 1,000 Units by mouth      atorvastatin (LIPITOR) 10 MG tablet TAKE 1 TABLET BY MOUTH DAILY 90 tablet 1    metFORMIN (GLUCOPHAGE-XR) 500 MG extended release tablet TAKE 1 TABLET BY MOUTH TWICE DAILY 180 tablet 1    aspirin EC 81 MG EC tablet Take 1 tablet by mouth daily 30 tablet 3    loratadine (CLARITIN) 10 MG tablet Take 10 mg by mouth daily.  Calcium Carbonate-Vitamin D (CALCIUM + D PO) Take 1 tablet by mouth daily.        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 (HCC)        Past Medical History:   Diagnosis Date    Calculus of kidney 5/2/2011    Contusion of right hand 9/9/2015    Rib contusion 9/9/2015    Scoliosis (and kyphoscoliosis), idiopathic 5/2/2011    Strain of thoracic region 9/9/2015    Type 2 diabetes mellitus without complication, without long-term current use of insulin ([facility] [address] 7/15/2018    Unspecified tinnitus 5/2/2011       Past Surgical History:   Procedure Laterality Date    CHOLECYSTECTOMY      MARTHA AND BSO          Social History     Tobacco Use    Smoking status: Never Smoker    Smokeless tobacco: Never Used   Substance Use Topics    Alcohol use: Yes     Comment: occ    Drug use: No        Family History   Problem Relation Age of Onset    Diabetes Mother [de-identified]    Osteoporosis Mother     Other Mother 80        a-fib    Breast Cancer Sister 46        BRAC negative. Review of Systems  Review of Systems    Objective:   Physical Exam  Wt Readings from Last 3 Encounters:   12/07/21 157 lb (71.2 kg)   08/11/21 162 lb 6.4 oz (73.7 kg)   11/24/20 160 lb (72.6 kg)     Temp Readings from Last 3 Encounters:   12/07/21 97.8 °F (36.6 °C) (Temporal)   08/11/21 97.1 °F (36.2 °C) (Temporal)   11/24/20 97.5 °F (36.4 °C) (Temporal)     BP Readings from Last 3 Encounters:   12/07/21 132/74   08/11/21 122/78   11/24/20 127/75     Pulse Readings from Last 3 Encounters:   12/07/21 92   08/11/21 80   11/24/20 78      NAD   Skin is warm and dry. Well hydrated. No rash. Mood +, affect is normal.   The neck is supple and free of adenopathy or masses, the thyroid is normal without enlargement or nodules. Chest: clear with no wheezes or rales. No retractions, or use of accessory muscles noted. Cardiovascular: PMI is not displaced, and no thrill noted. Regular rate and rhythm with no rub, murmur or gallop. No peripheral edema. The abdomen is soft without tenderness, guarding, mass, rebound or organomegaly. Aorta, femoral, DP and PT pulses intact. Sensation normal to monofilament feet bilaterally. Feet in good repair, without significant callouses and without ulceration or deformity. Assessment / Plan:        Diagnosis Orders   1.  Type 2 diabetes mellitus with both eyes affected by mild nonproliferative retinopathy without macular edema, without long-term current use of insulin (HCC)  dapagliflozin (FARXIGA) 10 MG tablet    Comprehensive Metabolic Panel    Hemoglobin A1C   not at goal  Add [place]  Discussed diet, exercise and weight loss strategies   Declines referral to dietician. 2. Pure hypercholesterolemia  Lipid Panel   3. Need for vaccination  Flu  COVID booster addressed. 4. Psychophysiological insomnia  traZODone (DESYREL) 50 MG tablet  Stop Tylenol PM  Risk of alcohol addressed. Side effects of current medications reviewed and questions answered. Follow up in 3 months or prn. On the basis of positive falls risk screening, assessment and plan is as follows: referred to on line balance exercise.

## 2021-12-07 ENCOUNTER — OFFICE VISIT (OUTPATIENT)
Dept: FAMILY MEDICINE CLINIC | Age: 71
End: 2021-12-07
Payer: MEDICARE

## 2021-12-07 VITALS
OXYGEN SATURATION: 95 % | SYSTOLIC BLOOD PRESSURE: 128 MMHG | WEIGHT: 157 LBS | BODY MASS INDEX: 28.72 KG/M2 | DIASTOLIC BLOOD PRESSURE: 74 MMHG | RESPIRATION RATE: 14 BRPM | TEMPERATURE: 97.8 F | HEART RATE: 92 BPM

## 2021-12-07 DIAGNOSIS — E78.00 PURE HYPERCHOLESTEROLEMIA: ICD-10-CM

## 2021-12-07 DIAGNOSIS — E11.3293 TYPE 2 DIABETES MELLITUS WITH BOTH EYES AFFECTED BY MILD NONPROLIFERATIVE RETINOPATHY WITHOUT MACULAR EDEMA, WITHOUT LONG-TERM CURRENT USE OF INSULIN (HCC): Primary | ICD-10-CM

## 2021-12-07 DIAGNOSIS — Z91.81 AT HIGH RISK FOR FALLS: ICD-10-CM

## 2021-12-07 DIAGNOSIS — Z23 NEED FOR VACCINATION: ICD-10-CM

## 2021-12-07 DIAGNOSIS — F51.04 PSYCHOPHYSIOLOGICAL INSOMNIA: ICD-10-CM

## 2021-12-07 PROCEDURE — G8399 PT W/DXA RESULTS DOCUMENT: HCPCS | Performed by: FAMILY MEDICINE

## 2021-12-07 PROCEDURE — 1090F PRES/ABSN URINE INCON ASSESS: CPT | Performed by: FAMILY MEDICINE

## 2021-12-07 PROCEDURE — 90694 VACC AIIV4 NO PRSRV 0.5ML IM: CPT | Performed by: FAMILY MEDICINE

## 2021-12-07 PROCEDURE — 99214 OFFICE O/P EST MOD 30 MIN: CPT | Performed by: FAMILY MEDICINE

## 2021-12-07 PROCEDURE — 1036F TOBACCO NON-USER: CPT | Performed by: FAMILY MEDICINE

## 2021-12-07 PROCEDURE — G0008 ADMIN INFLUENZA VIRUS VAC: HCPCS | Performed by: FAMILY MEDICINE

## 2021-12-07 PROCEDURE — G8417 CALC BMI ABV UP PARAM F/U: HCPCS | Performed by: FAMILY MEDICINE

## 2021-12-07 PROCEDURE — G8484 FLU IMMUNIZE NO ADMIN: HCPCS | Performed by: FAMILY MEDICINE

## 2021-12-07 PROCEDURE — 3017F COLORECTAL CA SCREEN DOC REV: CPT | Performed by: FAMILY MEDICINE

## 2021-12-07 PROCEDURE — 1123F ACP DISCUSS/DSCN MKR DOCD: CPT | Performed by: FAMILY MEDICINE

## 2021-12-07 PROCEDURE — 2022F DILAT RTA XM EVC RTNOPTHY: CPT | Performed by: FAMILY MEDICINE

## 2021-12-07 PROCEDURE — 4040F PNEUMOC VAC/ADMIN/RCVD: CPT | Performed by: FAMILY MEDICINE

## 2021-12-07 PROCEDURE — G8427 DOCREV CUR MEDS BY ELIG CLIN: HCPCS | Performed by: FAMILY MEDICINE

## 2021-12-07 PROCEDURE — 3046F HEMOGLOBIN A1C LEVEL >9.0%: CPT | Performed by: FAMILY MEDICINE

## 2021-12-07 RX ORDER — FAMOTIDINE 20 MG
1000 TABLET ORAL
COMMUNITY

## 2021-12-07 RX ORDER — TRAZODONE HYDROCHLORIDE 50 MG/1
25-50 TABLET ORAL NIGHTLY
Qty: 30 TABLET | Refills: 2 | Status: SHIPPED | OUTPATIENT
Start: 2021-12-07

## 2021-12-16 ENCOUNTER — TELEPHONE (OUTPATIENT)
Dept: FAMILY MEDICINE CLINIC | Age: 71
End: 2021-12-16

## 2021-12-16 DIAGNOSIS — E11.3293 TYPE 2 DIABETES MELLITUS WITH BOTH EYES AFFECTED BY MILD NONPROLIFERATIVE RETINOPATHY WITHOUT MACULAR EDEMA, WITHOUT LONG-TERM CURRENT USE OF INSULIN (HCC): ICD-10-CM

## 2021-12-16 NOTE — TELEPHONE ENCOUNTER
Queenie Nath called stating Dr. Theresa Unger prescribed her [place] on 12/7/2021. She spoke with her insurance and Elfida Braver is more affordable than [place]. She would like to know if her script can be changed to Elfida Braver. Please advise. Thanks.         Queenie Nath [phone] (home)       [facility] [place] [id number]

## 2021-12-17 RX ORDER — CANAGLIFLOZIN 300 MG/1
300 TABLET, FILM COATED ORAL
Qty: 90 TABLET | Refills: 1 | Status: SHIPPED | OUTPATIENT
Start: 2021-12-17 | End: 2021-12-21

## 2021-12-20 NOTE — TELEPHONE ENCOUNTER
[facility] called stating that the patient would like Richard Barrientos called in now; the other( [facility]) is more expensive. Please advise. Thanks.

## 2022-01-03 NOTE — PROGRESS NOTES
Subjective:      Patient ID: Srinivasan Villegas 70 y.o. female. The encounter diagnosis was Pedal edema. HPI    Had plantar fasciitis on the right. Has resolved but she irritated the side of the right foot by walking funny. Ortho gave her a brace that she stopped using - hurt her arch. Has swelling in the right leg since about 12/12. Ortho did not think it was significant. She has no leg pain. She has had recurrent swelling in the right leg since her last baby; in the past resolved in a day or tow. Is lasting longer this time. She was eating a lot of salt; since last visit with me on 12/7 she is eating less salt and sugar. Swelling is gone in the AM, worse as the day goes on. She denies chest pain, shortness or breath or cough. She used an ACE wrap a few days; helped the aching but does not help the swelling. occ takes Ibuprofen - helps the aching but does not make the swelling worse. Has not taking ibuprofen for one. Week.    Had doppler for this in the past.     Lab Results   Component Value Date     12/02/2021    K 3.7 12/02/2021    CL 96 (L) 12/02/2021    CO2 22 12/02/2021    BUN 12 12/02/2021    CREATININE <0.5 (L) 12/02/2021    GLUCOSE 289 (H) 12/02/2021    CALCIUM 8.9 12/02/2021    PROT 6.7 08/09/2021    LABALBU 4.2 08/09/2021    BILITOT 2.2 (H) 08/09/2021    ALKPHOS 83 08/09/2021    AST 31 08/09/2021    ALT 35 08/09/2021    LABGLOM >60 12/02/2021    GFRAA >60 12/02/2021    AGRATIO 1.7 08/09/2021    GLOB 2.5 08/09/2021        Lab Results   Component Value Date    WBC 6.1 08/09/2021    HGB 15.8 08/09/2021    HCT 44.0 08/09/2021    MCV 96.5 08/09/2021     08/09/2021      TSH   Date Value Ref Range Status   08/09/2021 2.25 0.27 - 4.20 uIU/mL Final   01/04/2021 2.62 0.27 - 4.20 uIU/mL Final   01/08/2020 1.75 0.27 - 4.20 uIU/mL Final      Lab Results   Component Value Date    LABA1C 9.9 12/02/2021     Lab Results   Component Value Date    .4 12/02/2021        Outpatient Medications Marked as Taking for the 1/4/22 encounter (Office Visit) with Dolores Bruce MD   Medication Sig Dispense Refill    dapagliflozin (FARXIGA) 10 MG tablet Take 1 tablet by mouth every morning 90 tablet 0    Vitamin D, Cholecalciferol, 25 MCG (1000 UT) CAPS Take 1,000 Units by mouth      traZODone (DESYREL) 50 MG tablet Take 0.5-1 tablets by mouth nightly 30 tablet 2    atorvastatin (LIPITOR) 10 MG tablet TAKE 1 TABLET BY MOUTH DAILY 90 tablet 1    metFORMIN (GLUCOPHAGE-XR) 500 MG extended release tablet TAKE 1 TABLET BY MOUTH TWICE DAILY 180 tablet 1    aspirin EC 81 MG EC tablet Take 1 tablet by mouth daily 30 tablet 3    loratadine (CLARITIN) 10 MG tablet Take 10 mg by mouth daily.  Calcium Carbonate-Vitamin D (CALCIUM + D PO) Take 1 tablet by mouth daily. 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 [address] 7/15/2018    Unspecified tinnitus 5/2/2011       Past Surgical History:   Procedure Laterality Date    CHOLECYSTECTOMY      MARTHA AND BSO          Family History   Problem Relation Age of Onset    Diabetes Mother [de-identified]    Osteoporosis Mother     Other Mother 80        a-fib    Breast Cancer Sister 46        BRAC negative.        Social History Tobacco Use    Smoking status: Never Smoker    Smokeless tobacco: Never Used   Substance Use Topics    Alcohol use: Yes     Comment: occ    Drug use: No            Review of Systems  Review of Systems    Objective:   Physical Exam  Vitals:    01/04/22 1546   BP: 120/76   Pulse: 82   Resp: 12   Temp: 97.7 °F (36.5 °C)   TempSrc: Temporal   SpO2: 96%   Weight: 159 lb 6.4 oz (72.3 kg)       Physical Exam    NAD    Skin is warm and dry. The neck is supple and free of adenopathy or masses, the thyroid is normal without enlargement or nodules. Chest: clear with no wheezes or rales. No retractions, or use of accessory muscles noted. Cardiovascular: PMI is not displaced, and no thrill noted. Regular rate and rhythm with no rub, murmur or gallop. 1+ right and trace  peripheral edema. The abdomen is soft without tenderness, guarding, mass, rebound or organomegaly. Bowel sounds are normal. No inguinal adenopathy. Aorta, femoral, DP and PT pulses intact.    + varicose veins moderate right calf. No left leg palpable varicose veins. No calf or thigh tenderness. Negative Jerry's    Assessment:       Diagnosis Orders   1. Pedal edema  D-DIMER, QUANTITATIVE  Likely venous stasis   2. Type 2 diabetes mellitus with both eyes affected by mild nonproliferative retinopathy without macular edema, without long-term current use of insulin (HCC)  Continue improved diet and exercise. Labs in March   3. Edema of right lower leg due to venous stasis  triamterene-hydroCHLOROthiazide (DYAZIDE) 37.5-25 MG per capsule  Low Na diet, support hose, elevate legs. Diuretic PRN           Plan:      Side effects of current medications reviewed and questions answered. Call or return to clinic prn if these symptoms worsen or fail to improve as anticipated.

## 2022-01-04 ENCOUNTER — OFFICE VISIT (OUTPATIENT)
Dept: FAMILY MEDICINE CLINIC | Age: 72
End: 2022-01-04
Payer: MEDICARE

## 2022-01-04 VITALS
WEIGHT: 159.4 LBS | DIASTOLIC BLOOD PRESSURE: 76 MMHG | HEART RATE: 82 BPM | TEMPERATURE: 97.7 F | OXYGEN SATURATION: 96 % | SYSTOLIC BLOOD PRESSURE: 120 MMHG | RESPIRATION RATE: 12 BRPM | BODY MASS INDEX: 29.15 KG/M2

## 2022-01-04 DIAGNOSIS — E11.3293 TYPE 2 DIABETES MELLITUS WITH BOTH EYES AFFECTED BY MILD NONPROLIFERATIVE RETINOPATHY WITHOUT MACULAR EDEMA, WITHOUT LONG-TERM CURRENT USE OF INSULIN (HCC): ICD-10-CM

## 2022-01-04 DIAGNOSIS — R60.0 PEDAL EDEMA: Primary | ICD-10-CM

## 2022-01-04 DIAGNOSIS — I87.2 EDEMA OF RIGHT LOWER LEG DUE TO VENOUS STASIS: ICD-10-CM

## 2022-01-04 DIAGNOSIS — R60.0 EDEMA OF RIGHT LOWER LEG DUE TO VENOUS STASIS: ICD-10-CM

## 2022-01-04 PROCEDURE — 2022F DILAT RTA XM EVC RTNOPTHY: CPT | Performed by: FAMILY MEDICINE

## 2022-01-04 PROCEDURE — G8484 FLU IMMUNIZE NO ADMIN: HCPCS | Performed by: FAMILY MEDICINE

## 2022-01-04 PROCEDURE — G8417 CALC BMI ABV UP PARAM F/U: HCPCS | Performed by: FAMILY MEDICINE

## 2022-01-04 PROCEDURE — 4040F PNEUMOC VAC/ADMIN/RCVD: CPT | Performed by: FAMILY MEDICINE

## 2022-01-04 PROCEDURE — 3046F HEMOGLOBIN A1C LEVEL >9.0%: CPT | Performed by: FAMILY MEDICINE

## 2022-01-04 PROCEDURE — 99214 OFFICE O/P EST MOD 30 MIN: CPT | Performed by: FAMILY MEDICINE

## 2022-01-04 PROCEDURE — G8399 PT W/DXA RESULTS DOCUMENT: HCPCS | Performed by: FAMILY MEDICINE

## 2022-01-04 PROCEDURE — 1123F ACP DISCUSS/DSCN MKR DOCD: CPT | Performed by: FAMILY MEDICINE

## 2022-01-04 PROCEDURE — 1036F TOBACCO NON-USER: CPT | Performed by: FAMILY MEDICINE

## 2022-01-04 PROCEDURE — 3017F COLORECTAL CA SCREEN DOC REV: CPT | Performed by: FAMILY MEDICINE

## 2022-01-04 PROCEDURE — G8427 DOCREV CUR MEDS BY ELIG CLIN: HCPCS | Performed by: FAMILY MEDICINE

## 2022-01-04 PROCEDURE — 1090F PRES/ABSN URINE INCON ASSESS: CPT | Performed by: FAMILY MEDICINE

## 2022-01-04 RX ORDER — TRIAMTERENE AND HYDROCHLOROTHIAZIDE 37.5; 25 MG/1; MG/1
1 CAPSULE ORAL DAILY PRN
Qty: 30 CAPSULE | Refills: 2 | Status: SHIPPED | OUTPATIENT
Start: 2022-01-04 | End: 2022-10-17

## 2022-01-05 ENCOUNTER — HOSPITAL ENCOUNTER (OUTPATIENT)
Age: 72
Discharge: HOME | End: 2022-01-05
Payer: MEDICARE

## 2022-01-05 DIAGNOSIS — R60.0 PEDAL EDEMA: ICD-10-CM

## 2022-01-05 LAB — D DIMER: <200 NG/ML DDU (ref 0–229)

## 2022-01-05 PROCEDURE — 85379 FIBRIN DEGRADATION QUANT: CPT

## 2022-01-05 PROCEDURE — 36415 COLL VENOUS BLD VENIPUNCTURE: CPT

## 2022-03-14 DIAGNOSIS — E11.3293 TYPE 2 DIABETES MELLITUS WITH BOTH EYES AFFECTED BY MILD NONPROLIFERATIVE RETINOPATHY WITHOUT MACULAR EDEMA, WITHOUT LONG-TERM CURRENT USE OF INSULIN (HCC): ICD-10-CM

## 2022-03-15 RX ORDER — DAPAGLIFLOZIN 10 MG/1
TABLET, FILM COATED ORAL
Qty: 90 TABLET | Refills: 0 | Status: SHIPPED | OUTPATIENT
Start: 2022-03-15 | End: 2022-06-13

## 2022-03-15 NOTE — TELEPHONE ENCOUNTER
Requested Prescriptions     Pending Prescriptions Disp Refills    FARXIGA 10 MG tablet [Pharmacy Med Name: FARXIGA 10MG TABLETS] 90 tablet 0     Sig: TAKE 1 TABLET BY MOUTH EVERY MORNING       LOV 1/4/2022  No f/u  Labs 12/2/21

## 2022-03-20 DIAGNOSIS — E11.3293 TYPE 2 DIABETES MELLITUS WITH BOTH EYES AFFECTED BY MILD NONPROLIFERATIVE RETINOPATHY WITHOUT MACULAR EDEMA, WITHOUT LONG-TERM CURRENT USE OF INSULIN (HCC): Primary | ICD-10-CM

## 2022-03-20 PROCEDURE — 3044F HG A1C LEVEL LT 7.0%: CPT | Performed by: FAMILY MEDICINE

## 2022-04-11 NOTE — PROGRESS NOTES
Subjective:      Patient ID: Mortimer Safe Bogelfemale 70 y.o.        HPI  Jose Loya is here for follow up for DM, HTN and hyperlipidemia. DUE mammogram.     Treatment Adherence:   Medication compliance:  compliant most of the time  Diet compliance:  compliant most of the time  Weight trend: stable  Current exercise: sporadic. Weights. Not much aerobics due to plantar fasciitis. Barriers: plantar fasciitis, tendonitis. Seeing podiatrist.    Maria Del Carmen Cameron that balance is off. No falls, numbness feet, vertigo. Has chronic hearing loss. She had audiogram a few years ago; would like to repeat. Has cut alcohol to almost nothing. Diabetes Mellitus Type 2: Current symptoms/problems include burning feet. Not numb. Stable. .    Significant improvement in HGA1C !.      Home blood sugar records: patient does not test  Any episodes of hypoglycemia? no  Eye exam current (within one year): yes  Tobacco history: She  reports that she has never smoked. She has never used smokeless tobacco.   Daily Aspirin? Yes    Hypertension:  Home blood pressure monitoring: No.  She is adherent to a low sodium diet. Patient denies chest pain, shortness of breath, blurred vision, peripheral edema and palpitations. Antihypertensive medication side effects: no medication side effects noted. Use of agents associated with hypertension: none. Hyperlipidemia:  No new myalgias or GI upset on atorvastatin (Lipitor).        Lab Results   Component Value Date    LABA1C 6.0 03/19/2022    LABA1C 9.9 12/02/2021    LABA1C 7.1 08/09/2021     Lab Results   Component Value Date    LABMICR <1.20 01/04/2021    CREATININE <0.5 (L) 03/19/2022     Lab Results   Component Value Date    ALT 30 03/19/2022    AST 41 (H) 03/19/2022     Lab Results   Component Value Date    CHOL 145 03/19/2022    TRIG 172 (H) 03/19/2022    HDL 53 03/19/2022    LDLCALC 58 03/19/2022    LDLDIRECT 119 (H) 07/13/2018         Outpatient Medications Marked as Taking for the 4/15/22 encounter (Office Visit) with Marry Lainez MD   Medication Sig Dispense Refill    TURMERIC PO Take by mouth      metFORMIN (GLUCOPHAGE-XR) 500 MG extended release tablet TAKE 1 TABLET BY MOUTH TWICE DAILY 180 tablet 1    atorvastatin (LIPITOR) 10 MG tablet TAKE 1 TABLET BY MOUTH DAILY 90 tablet 1    FARXIGA 10 MG tablet TAKE 1 TABLET BY MOUTH EVERY MORNING 90 tablet 0    triamterene-hydroCHLOROthiazide (DYAZIDE) 37.5-25 MG per capsule Take 1 capsule by mouth daily as needed (edema) 30 capsule 2    Vitamin D, Cholecalciferol, 25 MCG (1000 UT) CAPS Take 1,000 Units by mouth      traZODone (DESYREL) 50 MG tablet Take 0.5-1 tablets by mouth nightly 30 tablet 2    aspirin EC 81 MG EC tablet Take 1 tablet by mouth daily 30 tablet 3    loratadine (CLARITIN) 10 MG tablet Take 10 mg by mouth daily.  Calcium Carbonate-Vitamin D (CALCIUM + D PO) Take 1 tablet by mouth daily.        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 (HCC)        Past Medical History:   Diagnosis Date    Calculus of kidney 5/2/2011    Contusion of right hand 9/9/2015    Rib contusion 9/9/2015    Scoliosis (and kyphoscoliosis), idiopathic 5/2/2011    Strain of thoracic region 9/9/2015    Type 2 diabetes mellitus without complication, without long-term current use of insulin ([facility] [address] 7/15/2018    Unspecified tinnitus 5/2/2011       Past Surgical History:   Procedure Laterality Date    CHOLECYSTECTOMY      MARTHA AND BSO          Social History     Tobacco Use    Smoking status: Never Smoker    Smokeless tobacco: Never Used   Substance Use Topics    Alcohol use: Yes     Comment: occ    Drug use: No        Family History   Problem Relation Age of Onset    Diabetes Mother [de-identified]    Osteoporosis Mother     Other Mother 80        a-fib   [facility] Breast Cancer Sister 46        BRAC negative. Review of Systems  Review of Systems    Objective:   Physical Exam  Wt Readings from Last 3 Encounters:   04/15/22 160 lb (72.6 kg)   01/04/22 159 lb 6.4 oz (72.3 kg)   12/07/21 157 lb (71.2 kg)     Temp Readings from Last 3 Encounters:   04/15/22 97.9 °F (36.6 °C) (Temporal)   01/04/22 97.7 °F (36.5 °C) (Temporal)   12/07/21 97.8 °F (36.6 °C) (Temporal)     BP Readings from Last 3 Encounters:   04/15/22 112/70   01/04/22 120/76   12/07/21 128/74     Pulse Readings from Last 3 Encounters:   04/15/22 83   01/04/22 82   12/07/21 92      NAD   Skin is warm and dry. Well hydrated. No rash. Mood +, affect is normal.   The neck is supple and free of adenopathy or masses, the thyroid is normal without enlargement or nodules. Chest: clear with no wheezes or rales. No retractions, or use of accessory muscles noted. Cardiovascular: PMI is not displaced, and no thrill noted. Regular rate and rhythm with no rub, murmur or gallop. No peripheral edema. The abdomen is soft without tenderness, guarding, mass, rebound or organomegaly. Aorta, femoral, DP and PT pulses intact. Sensation normal to monofilament feet bilaterally. Feet in good repair, without significant callouses and without ulceration or deformity. Assessment / Plan:        Diagnosis Orders   1. Type 2 diabetes mellitus with both eyes affected by mild nonproliferative retinopathy without macular edema, without long-term current use of insulin (HCC)  POCT microalbumin  Much improved  Discussed diet, exercise and weight loss strategies  Continue Farxiaga, Metformin    2. Pure hypercholesterolemia     3. Hereditary and idiopathic peripheral neuropathy     4.  Encounter for screening mammogram for malignant neoplasm of breast  MAIA DIGITAL SCREEN W OR WO CAD BILATERAL   5. Tinnitus of both ears  Jason Soto Coffer, [place]. 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 Side effects of current medications reviewed and questions answered. Follow up in 6 months or prn.

## 2022-04-13 DIAGNOSIS — E78.00 PURE HYPERCHOLESTEROLEMIA: ICD-10-CM

## 2022-04-13 DIAGNOSIS — E11.9 TYPE 2 DIABETES MELLITUS WITHOUT COMPLICATION, WITHOUT LONG-TERM CURRENT USE OF INSULIN (HCC): ICD-10-CM

## 2022-04-13 RX ORDER — METFORMIN HYDROCHLORIDE 500 MG/1
TABLET, EXTENDED RELEASE ORAL
Qty: 180 TABLET | Refills: 1 | Status: SHIPPED | OUTPATIENT
Start: 2022-04-13 | End: 2022-10-12

## 2022-04-13 RX ORDER — ATORVASTATIN CALCIUM 10 MG/1
10 TABLET, FILM COATED ORAL DAILY
Qty: 90 TABLET | Refills: 1 | Status: SHIPPED | OUTPATIENT
Start: 2022-04-13 | End: 2022-10-12

## 2022-04-13 NOTE — TELEPHONE ENCOUNTER
Requested Prescriptions     Pending Prescriptions Disp Refills    metFORMIN (GLUCOPHAGE-XR) 500 MG extended release tablet [Pharmacy Med Name: METFORMIN ER 500MG 24HR TABS] 180 tablet 1     Sig: TAKE 1 TABLET BY MOUTH TWICE DAILY    atorvastatin (LIPITOR) 10 MG tablet [Pharmacy Med Name: ATORVASTATIN 10MG TABLETS] 90 tablet 1     Sig: TAKE 1 TABLET BY MOUTH DAILY       LOV 1/4/2022  Nov 4/15/22  Labs 3/19/22

## 2022-04-15 ENCOUNTER — OFFICE VISIT (OUTPATIENT)
Dept: FAMILY MEDICINE CLINIC | Age: 72
End: 2022-04-15
Payer: MEDICARE

## 2022-04-15 VITALS
BODY MASS INDEX: 29.26 KG/M2 | SYSTOLIC BLOOD PRESSURE: 112 MMHG | RESPIRATION RATE: 13 BRPM | OXYGEN SATURATION: 96 % | TEMPERATURE: 97.9 F | HEART RATE: 83 BPM | DIASTOLIC BLOOD PRESSURE: 70 MMHG | WEIGHT: 160 LBS

## 2022-04-15 DIAGNOSIS — H93.13 TINNITUS OF BOTH EARS: ICD-10-CM

## 2022-04-15 DIAGNOSIS — Z12.31 ENCOUNTER FOR SCREENING MAMMOGRAM FOR MALIGNANT NEOPLASM OF BREAST: ICD-10-CM

## 2022-04-15 DIAGNOSIS — E78.00 PURE HYPERCHOLESTEROLEMIA: ICD-10-CM

## 2022-04-15 DIAGNOSIS — E11.3293 TYPE 2 DIABETES MELLITUS WITH BOTH EYES AFFECTED BY MILD NONPROLIFERATIVE RETINOPATHY WITHOUT MACULAR EDEMA, WITHOUT LONG-TERM CURRENT USE OF INSULIN (HCC): Primary | ICD-10-CM

## 2022-04-15 DIAGNOSIS — G60.9 HEREDITARY AND IDIOPATHIC PERIPHERAL NEUROPATHY: ICD-10-CM

## 2022-04-15 LAB
CREATININE URINE POCT: 48.2
MICROALBUMIN/CREAT 24H UR: <5 MG/G{CREAT}
MICROALBUMIN/CREAT UR-RTO: <10.4

## 2022-04-15 PROCEDURE — 4040F PNEUMOC VAC/ADMIN/RCVD: CPT | Performed by: FAMILY MEDICINE

## 2022-04-15 PROCEDURE — 1123F ACP DISCUSS/DSCN MKR DOCD: CPT | Performed by: FAMILY MEDICINE

## 2022-04-15 PROCEDURE — 1090F PRES/ABSN URINE INCON ASSESS: CPT | Performed by: FAMILY MEDICINE

## 2022-04-15 PROCEDURE — 3044F HG A1C LEVEL LT 7.0%: CPT | Performed by: FAMILY MEDICINE

## 2022-04-15 PROCEDURE — G8399 PT W/DXA RESULTS DOCUMENT: HCPCS | Performed by: FAMILY MEDICINE

## 2022-04-15 PROCEDURE — 82044 UR ALBUMIN SEMIQUANTITATIVE: CPT | Performed by: FAMILY MEDICINE

## 2022-04-15 PROCEDURE — 99214 OFFICE O/P EST MOD 30 MIN: CPT | Performed by: FAMILY MEDICINE

## 2022-04-15 PROCEDURE — 1036F TOBACCO NON-USER: CPT | Performed by: FAMILY MEDICINE

## 2022-04-15 PROCEDURE — 3017F COLORECTAL CA SCREEN DOC REV: CPT | Performed by: FAMILY MEDICINE

## 2022-04-15 PROCEDURE — 2022F DILAT RTA XM EVC RTNOPTHY: CPT | Performed by: FAMILY MEDICINE

## 2022-04-15 PROCEDURE — G8417 CALC BMI ABV UP PARAM F/U: HCPCS | Performed by: FAMILY MEDICINE

## 2022-04-15 PROCEDURE — G8427 DOCREV CUR MEDS BY ELIG CLIN: HCPCS | Performed by: FAMILY MEDICINE

## 2022-06-13 DIAGNOSIS — E11.3293 TYPE 2 DIABETES MELLITUS WITH BOTH EYES AFFECTED BY MILD NONPROLIFERATIVE RETINOPATHY WITHOUT MACULAR EDEMA, WITHOUT LONG-TERM CURRENT USE OF INSULIN (HCC): ICD-10-CM

## 2022-06-13 RX ORDER — DAPAGLIFLOZIN 10 MG/1
TABLET, FILM COATED ORAL
Qty: 90 TABLET | Refills: 0 | Status: SHIPPED | OUTPATIENT
Start: 2022-06-13

## 2022-07-11 ENCOUNTER — TELEPHONE (OUTPATIENT)
Dept: FAMILY MEDICINE CLINIC | Age: 72
End: 2022-07-11

## 2022-07-11 NOTE — TELEPHONE ENCOUNTER
Patient tested positive for covid on 7/9/22 she did get paxlovid from the pharmacy. She went to urgent care and got the script. She has lost 5 pounds in 3 days. Feeling extremely tired. She is wondering if she should do anything else to help her feel better.

## 2022-07-12 RX ORDER — ONDANSETRON 4 MG/1
4 TABLET, FILM COATED ORAL 3 TIMES DAILY PRN
Qty: 15 TABLET | Refills: 0 | Status: SHIPPED | OUTPATIENT
Start: 2022-07-12

## 2022-07-12 RX ORDER — ONDANSETRON 4 MG/1
4 TABLET, FILM COATED ORAL 3 TIMES DAILY PRN
Qty: 15 TABLET | Refills: 0 | Status: SHIPPED | OUTPATIENT
Start: 2022-07-12 | End: 2022-07-12 | Stop reason: SDUPTHER

## 2022-07-12 NOTE — TELEPHONE ENCOUNTER
She should not take Nyquil until she is off Paxlovid 24 hour. . I will order Anyi De Anda for nausea.

## 2022-07-12 NOTE — TELEPHONE ENCOUNTER
Pt states that she is just very tired, no SOB, nausea, coughing, nasal drip. Pt thinks that the Paxlovid is upsetting her stomach and that is why she is nauseous now. she is on day 5 of the medication. She was taking both airborn and Paxolivd but stopped taking airborn because she was worried it was too much medicataion. Pt is wanting something for the nausea she is having. Can this please be sent in to the [address], [address] Phone: [phone]    Pt also needs to know if its okay to take NyQuil along with the Paxlovid? Pt wanted a f/u appt for when she gets back in town. This has been set up.     Thank you

## 2022-07-12 NOTE — TELEPHONE ENCOUNTER
Pt states that the zofran needs to be sent to the pharmacy below please     Thank you       Kristy Smith, [address] Phone: [phone]

## 2022-07-17 PROBLEM — U07.1 COVID-19 VIRUS INFECTION: Status: ACTIVE | Noted: 2022-07-17

## 2022-07-17 NOTE — PROGRESS NOTES
Subjective:      Patient ID: Diego Villegas 67 y.o. female. The primary encounter diagnosis was COVID-19 virus infection. A diagnosis of Type 2 diabetes mellitus with both eyes affected by mild nonproliferative retinopathy without macular edema, without long-term current use of insulin (HCC) was also pertinent to this visit. JERRY Watkins [place] was diagnosed with COVID on 7/9/22. She did not require hospitalization. She has been vaccinated x 3. Last dose. 2/10/22. Had severe ST, clear nasal discharge, dry cough. Felt a coldness in the left chest but no chest pain, palpitations or tachycardia. She complains of nausea that started with Paxlovid. She had loose stools, almost back to normal.  Continues to have fatigue. Takes a nap every day. Zofran - took for 2 to 3 days. Helped the nausea a bit. Did not want to keep taking it. Appetite is decreased a bit. Nausea is not better or worse with eating. No vomiting. Is not taking NSAID    DM II: last labs in the pre-diabetes range. Does not check FSBS.      Lab Results   Component Value Date/Time     03/19/2022 11:23 AM    K 4.9 03/19/2022 11:23 AM     03/19/2022 11:23 AM    CO2 20 03/19/2022 11:23 AM    BUN 13 03/19/2022 11:23 AM    CREATININE <0.5 03/19/2022 11:23 AM    GLUCOSE 188 03/19/2022 11:23 AM    CALCIUM 9.5 03/19/2022 11:23 AM       Lab Results   Component Value Date    LABA1C 6.0 03/19/2022     Lab Results   Component Value Date    .5 03/19/2022        Outpatient Medications Marked as Taking for the 7/19/22 encounter (Office Visit) with Mary Jo Olmos MD   Medication Sig Dispense Refill    FARXIGA 10 MG tablet TAKE 1 TABLET BY MOUTH EVERY MORNING 90 tablet 0    TURMERIC PO Take by mouth      metFORMIN (GLUCOPHAGE-XR) 500 MG extended release tablet TAKE 1 TABLET BY MOUTH TWICE DAILY 180 tablet 1    atorvastatin (LIPITOR) 10 MG tablet TAKE 1 TABLET BY MOUTH DAILY 90 tablet 1    triamterene-hydroCHLOROthiazide (DYAZIDE) 37.5-25 MG per capsule Take 1 capsule by mouth daily as needed (edema) 30 capsule 2    Vitamin D, Cholecalciferol, 25 MCG (1000 UT) CAPS Take 1,000 Units by mouth      traZODone (DESYREL) 50 MG tablet Take 0.5-1 tablets by mouth nightly 30 tablet 2    aspirin EC 81 MG EC tablet Take 1 tablet by mouth daily 30 tablet 3    loratadine (CLARITIN) 10 MG tablet Take 10 mg by mouth daily. Calcium Carbonate-Vitamin D (CALCIUM + D PO) Take 1 tablet by mouth daily. 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 ([facility] [address]    COVID-19 virus infection       Past Medical History:   Diagnosis Date    Calculus of kidney 5/2/2011    Contusion of right hand 9/9/2015    Rib contusion 9/9/2015    Scoliosis (and kyphoscoliosis), idiopathic 5/2/2011    Strain of thoracic region 9/9/2015    Type 2 diabetes mellitus without complication, without long-term current use of insulin ([facility] [address] 7/15/2018    Unspecified tinnitus 5/2/2011       Past Surgical History:   Procedure Laterality Date    CHOLECYSTECTOMY      TOTAL ABDOMINAL HYSTERECTOMY W/ BILATERAL SALPINGOOPHORECTOMY          Family History   Problem Relation Age of Onset    Diabetes Mother [de-identified]    Osteoporosis Mother     Other Mother 80        a-fib    Breast Cancer Sister 46        BRAC negative.        Social History     Tobacco Use    Smoking status: Never    Smokeless tobacco: Never   Substance Use Topics    Alcohol use: Yes     Comment: occ    Drug use: No            Review of Systems  Review of Systems    Objective:   Physical Exam  Vitals:    07/19/22 0957   BP: 106/72   Pulse: 81 Resp: 16   Temp: 97.8 °F (36.6 °C)   TempSrc: Temporal   SpO2: 98%   Weight: 150 lb 9.6 oz (68.3 kg)       Physical Exam    NAD    Skin is warm and dry. No rash. Well hydrated  Alert and oriented x 3. Mood and affect are normal.  The neck is supple and free of adenopathy or masses, the thyroid is normal without enlargement or nodules. Chest: clear with no wheezes or rales. No retractions, or use of accessory muscles noted. Cardiovascular: PMI is not displaced, and no thrill noted. Regular rate and rhythm with no rub, murmur or gallop. No peripheral edema. The abdomen is soft with minimal epigastric tenderness; no guarding, mass, rebound or organomegaly. Aorta, femoral, DP and PT pulses intact. Assessment:       Diagnosis Orders   1. COVID-19 virus infection  Resolving   Advised to pace activity      2. Acute gastritis without hemorrhage, unspecified gastritis type  pantoprazole (PROTONIX) 40 MG tablet x 4 weeks           Side effects of current medications reviewed and questions answered. Call or return to clinic prn if these symptoms worsen or fail to improve as anticipated.        Plan:

## 2022-07-18 ENCOUNTER — PROCEDURE VISIT (OUTPATIENT)
Dept: AUDIOLOGY | Age: 72
End: 2022-07-18
Payer: COMMERCIAL

## 2022-07-18 ENCOUNTER — CLINICAL DOCUMENTATION (OUTPATIENT)
Dept: AUDIOLOGY | Age: 72
End: 2022-07-18

## 2022-07-18 ENCOUNTER — OFFICE VISIT (OUTPATIENT)
Dept: ENT CLINIC | Age: 72
End: 2022-07-18
Payer: COMMERCIAL

## 2022-07-18 VITALS — TEMPERATURE: 97.5 F | SYSTOLIC BLOOD PRESSURE: 126 MMHG | DIASTOLIC BLOOD PRESSURE: 82 MMHG | HEART RATE: 78 BPM

## 2022-07-18 DIAGNOSIS — H90.3 SENSORINEURAL HEARING LOSS (SNHL) OF BOTH EARS: ICD-10-CM

## 2022-07-18 DIAGNOSIS — H93.13 SUBJECTIVE TINNITUS OF BOTH EARS: Primary | ICD-10-CM

## 2022-07-18 DIAGNOSIS — H93.13 SUBJECTIVE TINNITUS OF BOTH EARS: Chronic | ICD-10-CM

## 2022-07-18 DIAGNOSIS — H90.3 BILATERAL SENSORINEURAL HEARING LOSS: Primary | Chronic | ICD-10-CM

## 2022-07-18 PROCEDURE — G8427 DOCREV CUR MEDS BY ELIG CLIN: HCPCS | Performed by: OTOLARYNGOLOGY

## 2022-07-18 PROCEDURE — 1036F TOBACCO NON-USER: CPT | Performed by: OTOLARYNGOLOGY

## 2022-07-18 PROCEDURE — 92557 COMPREHENSIVE HEARING TEST: CPT | Performed by: AUDIOLOGIST

## 2022-07-18 PROCEDURE — 3017F COLORECTAL CA SCREEN DOC REV: CPT | Performed by: OTOLARYNGOLOGY

## 2022-07-18 PROCEDURE — 99213 OFFICE O/P EST LOW 20 MIN: CPT | Performed by: OTOLARYNGOLOGY

## 2022-07-18 PROCEDURE — 1123F ACP DISCUSS/DSCN MKR DOCD: CPT | Performed by: OTOLARYNGOLOGY

## 2022-07-18 PROCEDURE — 92567 TYMPANOMETRY: CPT | Performed by: AUDIOLOGIST

## 2022-07-18 PROCEDURE — 1090F PRES/ABSN URINE INCON ASSESS: CPT | Performed by: OTOLARYNGOLOGY

## 2022-07-18 PROCEDURE — G8417 CALC BMI ABV UP PARAM F/U: HCPCS | Performed by: OTOLARYNGOLOGY

## 2022-07-18 PROCEDURE — G8399 PT W/DXA RESULTS DOCUMENT: HCPCS | Performed by: OTOLARYNGOLOGY

## 2022-07-18 NOTE — PROGRESS NOTES
Member benefits are eligible for hearing aid coverage that is not linked to [facility] ([facility]). Benefits are with insurance's hearing group [id number] [facility] hearing). Reference number of call: 4424. She is to call [phone]. She does have to option to proceed with [facility] for fitting of hearing aids if she is not interested in insurance vendor.

## 2022-07-18 NOTE — PROGRESS NOTES
[facility]       CHIEF COMPLAINT  Chief Complaint   Patient presents with    Hearing Problem     F/u hearing eval         HISTORY       Melchor Saleem is a 67 y.o. female here for follow up of hearing loss. EXAMINATION   WDWN NAD  Ears: TMs and EACs normal.       AUDIOGRAM    I independently interpreted the results of the audiogram, a test performed by another healthcare provider, showing bilateral symmetrical mild to moderate sensorineural hearing loss with excellent word recognition scores. COUNSELING    Audiogram results were reviewed and discussed with Higinio Dominguez. I advised of type and severity of hearing loss and the probable etiology of hearing loss of aging (presbycusis). I discussed the possible associated impairment and disability. I advised of the need for avoidance of prolonged or frequent exposure to excessive noise and the need for noise protection, if such exposure is unavoidable. I discussed the possible benefits of hearing aids, or other amplification therapy. I discussed the possible etiology of tinnitus and treatment/coping measures including masking techniques. I advised of the need for annual and prn hearing tests. Patient was advised of the greater decrease in word and speech understanding in the presence of background noise and of the expected difficulty understanding speech in the presence of moderate to high volume background noise associated with this hearing loss. Puja Hansen / Jo Fajardo / Colin Raman / Baldomero Segura was seen today for hearing problem. Diagnoses and all orders for this visit:    Bilateral sensorineural hearing loss    Subjective tinnitus of both ears         RECOMMENDATIONS / PLAN    Lipoflavonoid plus or other over-the-counter meds as needed for tinnitus. See patient instructions, on file for this visit, which were fully discussed with the patient. Return for recurrence of symptoms of excessive ear wax, or any other ear, nose, throat, or sinus problems.

## 2022-07-18 NOTE — PATIENT INSTRUCTIONS
You should consider amplification therapy, hearing aid, if you are having difficulty communicating and/or hearing important sounds. I recommend bilateral hearing aids for aural rehabilitation and to aid in restoring functional hearing. You may follow up with Dr. Miguel A Amezquita or with another hearing aid provider of your choice. You will probably have a decrease in understanding of speech in the presence of background noise and expected difficulty understanding speech in the presence of moderate to high level of background noise. This is typical of your type of hearing loss. You should return if your ears plug up with ear wax causing difficulty hearing or pressure. Most patients who use hearing aids, will need their ears cleaned every 6 months. You may use an over the counter ear wax removal kit (such as Murine, Bausch and Lomb, NeilMed, or Debrox wax removal system) for ear wax removal, as needed. It may help to use Debrox (OTC) for 4 days prior to future visits for ear cleaning. This may soften your ear wax and facilitate removal of the wax. You should return for an annual hearing test to monitor your hearing, and whenever your hearing further decreases significantly. You should employ the tinnitus masking techniques and strategies we discussed, as needed. Bedside tinnitus masking devices (e.g. a white noise machine, noise machine, noise rajinder on your cell phone, fan on in the room, radio with light music or tuned between stations to white noise static) can be very helpful. You should avoid exposure to excessively high levels of noise/sound and use hearing protection measures we discussed, as needed, if such exposure is unavoidable. I recommend that you use Lipoflavonoid Plus, (use brand name, not generic, for the first six months), for treatment of your tinnitus. This is available \"over the counter\" at your pharmacy.   You should take two orally three times a day for the first 60 days, then one orally three times a day thereafter. Take this medication continuously for six months. If you have seen no improvement in your tinnitus after six months, you should consider cessation of this treatment. NO Q-TIPS IN THE EARS  You should never clean your ears with a Q-tip, cotton tipped applicator, Debbie pin, paper clip, pen cap, nail file, or any other instrument. I recommend only use of one the several ear wax removal kits available \"over the counter\" (eg. Bausch and Lomb or Murine, or [employer]) if you feel a need to try to remove ear wax. No other methods should be self used for this purpose as there is danger of injury to the ear and risk of irreparable and irreversible permanent hearing loss and permanent dizziness.

## 2022-07-18 NOTE — PROGRESS NOTES
[address] [facility]    7/18/2022     Patient name: La Stroud  Primary Care Physician: Taurus Leavitt MD   Medical Record Number: [id number]     [facility]   1950, 67 y.o. female   [id number]       Referring Provider: Helene Julien MD  Referral Type: In an order in [address] [place] [address]    Reason for Visit: Evaluation of the cause of disorders of hearing, tinnitus, or balance. ADULT AUDIOLOGIC EVALUATION                    La Stroud is a 67 y.o. female seen today, 7/18/2022 , for a same-day add-on comprehensive audiologic evaluation. Patient was seen by Helene Julien MD following today's evaluation. AUDIOLOGIC AND OTHER PERTINENT MEDICAL HISTORY:      La Stroud noted history of tinnitus. Last audiogram in 2019 showed bilateral high frequency loss. She notes much difficulty with clarity of speech while conversing with others. Medical history is reportedly significant for Type 2 diabetes. IMPRESSIONS:      Today's results are consistent with bilateral sensorineural hearing loss with excellent word recognition for both ears, and normal middle ear function. Hearing loss is significant enough to result in difficulty understanding speech in most listening environments. Discussed good communication strategies, tinnitus management strategies, and recommended hearing aid evaluation. Patient to follow medical recommendations per  Helene Julien MD.    ASSESSMENT AND FINDINGS:     Otoscopy revealed: Visible tympanic membrane bilaterally    Reliability: Good   Transducer: Inserts    RIGHT EAR:  Hearing Sensitivity: Mild to moderate sensorineural hearing loss. ; 15 dB air-bone gap at 4K. Speech Recognition Threshold: 20 dB HL  Word Recognition: Excellent (%), based on NU-6   Tympanometry: Normal peak pressure and compliance, Type A tympanogram, consistent with normal middle ear function.   Acoustic Reflexes: Ipsilateral: Present at elevated sensation levels and Absent at isolated frequencies. LEFT EAR:  Hearing Sensitivity: Mild to moderate sensorineural hearing loss. ; 25 dB air-bone gap at [employer] Recognition Threshold: 25 dB HL  Word Recognition: Excellent (%), based on NU-6   Tympanometry: Normal peak pressure and compliance, Type A tympanogram, consistent with normal middle ear function. Acoustic Reflexes: Ipsilateral: Present at normal sensation levels, Present at elevated sensation levels, and Absent at isolated frequencies. COUNSELING:      Reviewed purpose of testing completed today, general auditory system function, and results obtained today. The following items are recommended based on patient report and results from today's appointment:   - Continue medical follow-up with Abhay Deal MD.   - Retest hearing as medically indicated and/or sooner if a change in hearing is noted. - If desired, schedule a Hearing Aid Evaluation (HAE) appointment to discuss hearing aid options. Chart CC'd to: Abhay Deal MD      Degree of   Hearing Sensitivity dB Range   Within Normal Limits (WNL) 0 - 20   Mild 20 - 40   Moderate 40 - 55   Moderately-Severe 55 - 70   Severe 70 - 90   Profound 90 +        RECOMMENDATIONS:        Abhay Deal MD to medically advise.     Alejandra Paulson  Audiologist    Electronically signed by Alejandra Paulson on 7/18/22 at 9:50 AM.

## 2022-07-19 ENCOUNTER — OFFICE VISIT (OUTPATIENT)
Dept: FAMILY MEDICINE CLINIC | Age: 72
End: 2022-07-19
Payer: COMMERCIAL

## 2022-07-19 VITALS
BODY MASS INDEX: 27.55 KG/M2 | WEIGHT: 150.6 LBS | SYSTOLIC BLOOD PRESSURE: 106 MMHG | HEART RATE: 81 BPM | TEMPERATURE: 97.8 F | OXYGEN SATURATION: 98 % | RESPIRATION RATE: 16 BRPM | DIASTOLIC BLOOD PRESSURE: 72 MMHG

## 2022-07-19 DIAGNOSIS — U07.1 COVID-19 VIRUS INFECTION: Primary | ICD-10-CM

## 2022-07-19 DIAGNOSIS — K29.00 ACUTE GASTRITIS WITHOUT HEMORRHAGE, UNSPECIFIED GASTRITIS TYPE: ICD-10-CM

## 2022-07-19 PROCEDURE — 99214 OFFICE O/P EST MOD 30 MIN: CPT | Performed by: FAMILY MEDICINE

## 2022-07-19 PROCEDURE — 1123F ACP DISCUSS/DSCN MKR DOCD: CPT | Performed by: FAMILY MEDICINE

## 2022-07-19 RX ORDER — PANTOPRAZOLE SODIUM 40 MG/1
40 TABLET, DELAYED RELEASE ORAL DAILY
Qty: 30 TABLET | Refills: 0 | Status: SHIPPED | OUTPATIENT
Start: 2022-07-19 | End: 2022-08-18

## 2022-07-19 RX ORDER — PANTOPRAZOLE SODIUM 40 MG/1
40 TABLET, DELAYED RELEASE ORAL DAILY
Qty: 90 TABLET | OUTPATIENT
Start: 2022-07-19 | End: 2022-08-18

## 2022-07-19 SDOH — ECONOMIC STABILITY: FOOD INSECURITY: WITHIN THE PAST 12 MONTHS, YOU WORRIED THAT YOUR FOOD WOULD RUN OUT BEFORE YOU GOT MONEY TO BUY MORE.: NEVER TRUE

## 2022-07-19 SDOH — ECONOMIC STABILITY: FOOD INSECURITY: WITHIN THE PAST 12 MONTHS, THE FOOD YOU BOUGHT JUST DIDN'T LAST AND YOU DIDN'T HAVE MONEY TO GET MORE.: NEVER TRUE

## 2022-07-19 ASSESSMENT — PATIENT HEALTH QUESTIONNAIRE - PHQ9
SUM OF ALL RESPONSES TO PHQ QUESTIONS 1-9: 12
SUM OF ALL RESPONSES TO PHQ QUESTIONS 1-9: 12
1. LITTLE INTEREST OR PLEASURE IN DOING THINGS: 2
4. FEELING TIRED OR HAVING LITTLE ENERGY: 3
9. THOUGHTS THAT YOU WOULD BE BETTER OFF DEAD, OR OF HURTING YOURSELF: 0
SUM OF ALL RESPONSES TO PHQ9 QUESTIONS 1 & 2: 4
SUM OF ALL RESPONSES TO PHQ QUESTIONS 1-9: 12
10. IF YOU CHECKED OFF ANY PROBLEMS, HOW DIFFICULT HAVE THESE PROBLEMS MADE IT FOR YOU TO DO YOUR WORK, TAKE CARE OF THINGS AT HOME, OR GET ALONG WITH OTHER PEOPLE: 1
5. POOR APPETITE OR OVEREATING: 2
6. FEELING BAD ABOUT YOURSELF - OR THAT YOU ARE A FAILURE OR HAVE LET YOURSELF OR YOUR FAMILY DOWN: 0
8. MOVING OR SPEAKING SO SLOWLY THAT OTHER PEOPLE COULD HAVE NOTICED. OR THE OPPOSITE, BEING SO FIGETY OR RESTLESS THAT YOU HAVE BEEN MOVING AROUND A LOT MORE THAN USUAL: 0
7. TROUBLE CONCENTRATING ON THINGS, SUCH AS READING THE NEWSPAPER OR WATCHING TELEVISION: 2
3. TROUBLE FALLING OR STAYING ASLEEP: 1
2. FEELING DOWN, DEPRESSED OR HOPELESS: 2
SUM OF ALL RESPONSES TO PHQ QUESTIONS 1-9: 12

## 2022-07-19 ASSESSMENT — SOCIAL DETERMINANTS OF HEALTH (SDOH): HOW HARD IS IT FOR YOU TO PAY FOR THE VERY BASICS LIKE FOOD, HOUSING, MEDICAL CARE, AND HEATING?: NOT HARD AT ALL

## 2022-07-19 NOTE — TELEPHONE ENCOUNTER
Last ov today   Last lab 3-19-22 Sinusitis, Adult  Sinusitis is soreness and inflammation of your sinuses. Sinuses are hollow spaces in the bones around your face. They are located:  · Around your eyes.  · In the middle of your forehead.  · Behind your nose.  · In your cheekbones.  Your sinuses and nasal passages are lined with a stringy fluid (mucus). Mucus normally drains out of your sinuses. When your nasal tissues get inflamed or swollen, the mucus can get trapped or blocked so air cannot flow through your sinuses. This lets bacteria, viruses, and funguses grow, and that leads to infection.  Follow these instructions at home:  Medicines  · Take, use, or apply over-the-counter and prescription medicines only as told by your doctor. These may include nasal sprays.  · If you were prescribed an antibiotic medicine, take it as told by your doctor. Do not stop taking the antibiotic even if you start to feel better.  Hydrate and Humidify  · Drink enough water to keep your pee (urine) clear or pale yellow.  · Use a cool mist humidifier to keep the humidity level in your home above 50%.  · Breathe in steam for 10-15 minutes, 3-4 times a day or as told by your doctor. You can do this in the bathroom while a hot shower is running.  · Try not to spend time in cool or dry air.  Rest  · Rest as much as possible.  · Sleep with your head raised (elevated).  · Make sure to get enough sleep each night.  General instructions  · Put a warm, moist washcloth on your face 3-4 times a day or as told by your doctor. This will help with discomfort.  · Wash your hands often with soap and water. If there is no soap and water, use hand .  · Do not smoke. Avoid being around people who are smoking (secondhand smoke).  · Keep all follow-up visits as told by your doctor. This is important.  Contact a doctor if:  · You have a fever.  · Your symptoms get worse.  · Your symptoms do not get better within 10 days.  Get help right away if:  · You have a very bad  headache.  · You cannot stop throwing up (vomiting).  · You have pain or swelling around your face or eyes.  · You have trouble seeing.  · You feel confused.  · Your neck is stiff.  · You have trouble breathing.  This information is not intended to replace advice given to you by your health care provider. Make sure you discuss any questions you have with your health care provider.  Document Released: 06/05/2009 Document Revised: 08/13/2017 Document Reviewed: 10/12/2016  Elsevier Interactive Patient Education © 2017 [employer]

## 2022-08-11 ENCOUNTER — HOSPITAL ENCOUNTER (OUTPATIENT)
Dept: WOMENS IMAGING | Age: 72
Discharge: HOME | End: 2022-08-11
Payer: COMMERCIAL

## 2022-08-11 VITALS — WEIGHT: 150 LBS | BODY MASS INDEX: 27.6 KG/M2 | HEIGHT: 62 IN

## 2022-08-11 DIAGNOSIS — Z12.31 ENCOUNTER FOR SCREENING MAMMOGRAM FOR MALIGNANT NEOPLASM OF BREAST: ICD-10-CM

## 2022-08-11 PROCEDURE — 77063 BREAST TOMOSYNTHESIS BI: CPT

## 2022-10-12 DIAGNOSIS — E11.9 TYPE 2 DIABETES MELLITUS WITHOUT COMPLICATION, WITHOUT LONG-TERM CURRENT USE OF INSULIN (HCC): ICD-10-CM

## 2022-10-12 DIAGNOSIS — E78.00 PURE HYPERCHOLESTEROLEMIA: ICD-10-CM

## 2022-10-12 RX ORDER — ATORVASTATIN CALCIUM 10 MG/1
10 TABLET, FILM COATED ORAL DAILY
Qty: 90 TABLET | Refills: 1 | Status: SHIPPED | OUTPATIENT
Start: 2022-10-12

## 2022-10-12 RX ORDER — METFORMIN HYDROCHLORIDE 500 MG/1
TABLET, EXTENDED RELEASE ORAL
Qty: 180 TABLET | Refills: 1 | Status: SHIPPED | OUTPATIENT
Start: 2022-10-12

## 2022-10-12 NOTE — TELEPHONE ENCOUNTER
Requested Prescriptions     Pending Prescriptions Disp Refills    metFORMIN (GLUCOPHAGE-XR) 500 MG extended release tablet [Pharmacy Med Name: METFORMIN ER 500MG 24HR TABS] 180 tablet 1     Sig: TAKE 1 TABLET BY MOUTH TWICE DAILY    atorvastatin (LIPITOR) 10 MG tablet [Pharmacy Med Name: ATORVASTATIN 10MG TABLETS] 90 tablet 1     Sig: TAKE 1 TABLET BY MOUTH DAILY       Last OV; 7/19/2022   Last labs; 3/19/2022  No F/u    Sent an message through [address] reminding pt she is due for an office visit.

## 2022-10-15 DIAGNOSIS — I87.2 EDEMA OF RIGHT LOWER LEG DUE TO VENOUS STASIS: ICD-10-CM

## 2022-10-15 DIAGNOSIS — R60.0 EDEMA OF RIGHT LOWER LEG DUE TO VENOUS STASIS: ICD-10-CM

## 2022-10-17 RX ORDER — TRIAMTERENE AND HYDROCHLOROTHIAZIDE 37.5; 25 MG/1; MG/1
CAPSULE ORAL
Qty: 90 CAPSULE | Refills: 1 | Status: SHIPPED | OUTPATIENT
Start: 2022-10-17

## 2022-10-17 NOTE — TELEPHONE ENCOUNTER
Last ov 7-19-22  No future appt   Last labs 3-19-22  Requested Prescriptions     Pending Prescriptions Disp Refills    triamterene-hydroCHLOROthiazide (DYAZIDE) 37.5-25 MG per capsule [Pharmacy Med Name: TRIAMTERENE 37.5MG/ HCTZ 25MG CAPS] 30 capsule 2     Sig: TAKE 1 CAPSULE BY MOUTH DAILY AS NEEDED FOR SWELLING

## 2022-12-15 DIAGNOSIS — E11.3293 TYPE 2 DIABETES MELLITUS WITH BOTH EYES AFFECTED BY MILD NONPROLIFERATIVE RETINOPATHY WITHOUT MACULAR EDEMA, WITHOUT LONG-TERM CURRENT USE OF INSULIN (HCC): ICD-10-CM

## 2022-12-15 NOTE — TELEPHONE ENCOUNTER
Last ov 7-19-22  Next ov 1-6-23  Pt will have April labs completed before appt   Requested Prescriptions     Pending Prescriptions Disp Refills    dapagliflozin (FARXIGA) 10 MG tablet 90 tablet 0     Sig: TAKE 1 TABLET BY MOUTH EVERY MORNING   '

## 2023-01-02 PROBLEM — U07.1 COVID-19 VIRUS INFECTION: Status: RESOLVED | Noted: 2022-07-17 | Resolved: 2023-01-02

## 2023-01-02 NOTE — PROGRESS NOTES
Subjective:      Patient ID: Brian Rutledge 67 y.o.        HPI  Sloan Galeano is here for follow up for DM, HTN and hyperlipidemia. Vaccines: due COVID booster and flu. Is going to get a flu vaccine today. Will have COVID as well. Colon cancer screening due. Received a letter from GI    Today mild ST and heaviness in the upper chest.  Was treated for sinus infection on  with prednisone. On  was getting worse and had mix up with urgent care on getting antibx. Finally was prescribed Augmentin. Now that off it for 2 to 3 days , feels sxs coming back. No fever. PND, sinus congestion. No cough. Treatment Adherence:   Medication compliance:  compliant most of the time  Diet compliance:   not cooking much since her  . Weight trend: stable  Current exercise: active caring for her home  Barriers: lack of motivation  Is considering joining [employer] class. Burning sensation in her knees only when she crosses her legs. No pain with taking steps. Diabetes Mellitus Type 2: Current symptoms/problems include none. Home blood sugar records: patient does not test  Any episodes of hypoglycemia? no  Eye exam current (within one year): yes  Tobacco history: She  reports that she has never smoked. She has never used smokeless tobacco.   Daily Aspirin? Yes    The 10-year ASCVD risk score (Gary DK, et al., 2019) is: 18.1%    Values used to calculate the score:      Age: 67 years      Sex: Female      Is Non- : No      Diabetic: Yes      Tobacco smoker: No      Systolic Blood Pressure: 804 mmHg      Is BP treated: No      HDL Cholesterol: 61 mg/dL      Total Cholesterol: 183 mg/dL       Home blood pressure monitoring: No.  She is adherent to a low sodium diet. Patient denies chest pain, shortness of breath, blurred vision, peripheral edema, and palpitations. Antihypertensive medication side effects: no medication side effects noted.   Use of agents associated with hypertension: none. Hyperlipidemia:  No new myalgias or GI upset on atorvastatin (Lipitor). Lab Results   Component Value Date    LABA1C 7.0 01/05/2023    LABA1C 6.0 03/19/2022    LABA1C 9.9 12/02/2021     Lab Results   Component Value Date    LABMICR <1.20 01/04/2021    CREATININE 0.6 01/05/2023     Lab Results   Component Value Date    ALT 27 01/05/2023    AST 26 01/05/2023     Lab Results   Component Value Date    CHOL 183 01/05/2023    TRIG 166 (H) 01/05/2023    HDL 61 (H) 01/05/2023    LDLCALC 89 01/05/2023    LDLDIRECT 119 (H) 07/13/2018         Outpatient Medications Marked as Taking for the 1/6/23 encounter (Office Visit) with Michela Barajas MD   Medication Sig Dispense Refill    dapagliflozin (FARXIGA) 10 MG tablet TAKE 1 TABLET BY MOUTH EVERY MORNING 90 tablet 0    triamterene-hydroCHLOROthiazide (DYAZIDE) 37.5-25 MG per capsule TAKE 1 CAPSULE BY MOUTH DAILY AS NEEDED FOR SWELLING 90 capsule 1    metFORMIN (GLUCOPHAGE-XR) 500 MG extended release tablet TAKE 1 TABLET BY MOUTH TWICE DAILY 180 tablet 1    atorvastatin (LIPITOR) 10 MG tablet TAKE 1 TABLET BY MOUTH DAILY 90 tablet 1    TURMERIC PO Take by mouth      Vitamin D, Cholecalciferol, 25 MCG (1000 UT) CAPS Take 1,000 Units by mouth      traZODone (DESYREL) 50 MG tablet Take 0.5-1 tablets by mouth nightly 30 tablet 2    aspirin EC 81 MG EC tablet Take 1 tablet by mouth daily 30 tablet 3    loratadine (CLARITIN) 10 MG tablet Take 10 mg by mouth daily. Calcium Carbonate-Vitamin D (CALCIUM + D PO) Take 1 tablet by mouth daily.         Ascorbic Acid (VITAMIN C) 500 MG tablet Take 1,000 mg by mouth daily           Allergies   Allergen Reactions    Naprosyn [Naproxen] Rash        Patient Active Problem List   Diagnosis    Allergic rhinitis    Migraine without aura    Insomnia    Hereditary and idiopathic peripheral neuropathy    Rosacea    Edema    Pure hypercholesterolemia    Diabetes mellitus with both eyes affected by mild nonproliferative retinopathy without macular edema (HCC)        Past Medical History:   Diagnosis Date    Calculus of kidney 5/2/2011    Contusion of right hand 9/9/2015    COVID-19 virus infection 7/17/2022    Rib contusion 9/9/2015    Scoliosis (and kyphoscoliosis), idiopathic 5/2/2011    Strain of thoracic region 9/9/2015    Type 2 diabetes mellitus without complication, without long-term current use of insulin (HCC) 7/15/2018    Unspecified tinnitus 5/2/2011       Past Surgical History:   Procedure Laterality Date    CHOLECYSTECTOMY      MARTHA AND BSO (CERVIX REMOVED)          Social History     Tobacco Use    Smoking status: Never    Smokeless tobacco: Never   Substance Use Topics    Alcohol use: Yes     Comment: occ    Drug use: No        Family History   Problem Relation Age of Onset    Diabetes Mother 80    Osteoporosis Mother     Other Mother 88        a-fib    Breast Cancer Sister 52        BRAC negative.        Review of Systems  Review of Systems    Objective:   Physical Exam  Wt Readings from Last 3 Encounters:   01/06/23 156 lb 3.2 oz (70.9 kg)   08/11/22 150 lb (68 kg)   07/19/22 150 lb 9.6 oz (68.3 kg)     Temp Readings from Last 3 Encounters:   01/06/23 97.8 °F (36.6 °C) (Temporal)   07/19/22 97.8 °F (36.6 °C) (Temporal)   07/18/22 97.5 °F (36.4 °C) (Temporal)     BP Readings from Last 3 Encounters:   01/06/23 118/70   07/19/22 106/72   07/18/22 126/82     Pulse Readings from Last 3 Encounters:   01/06/23 84   07/19/22 81   07/18/22 78      NAD   Skin is warm and dry. Well hydrated.  No rash.    Mood +, affect is normal.   The neck is supple and free of adenopathy or masses, the thyroid is normal without enlargement or nodules. Chest: clear with no wheezes or rales.  No retractions, or use of accessory muscles noted.   Cardiovascular: PMI is not displaced, and no thrill noted.  Regular rate and rhythm with no rub, murmur or gallop.  No peripheral edema.   The abdomen is soft without tenderness,  guarding, mass, rebound or organomegaly. Aorta, femoral, DP and PT pulses intact. Mild deformi  Diabetic foot exam:   Left Foot:   Visual Exam: normal   Pulse DP: 2+ (normal)   Filament test: 6/6     Right Foot:   Visual Exam: normal   Pulse DP: 2+ (normal)   Filament test: 6/6       Assessment / Plan:        Diagnosis Orders   1. Type 2 diabetes mellitus with both eyes affected by mild nonproliferative retinopathy without macular edema, without long-term current use of insulin (HCC)  HGA1C increased but at goal  Diet and exercise addressed. 2. Pure hypercholesterolemia  LDL at goal < 100  Continue statin      3. Need for vaccination  Flu and COVID as planned. 4. Type 2 diabetes mellitus without complication, without long-term current use of insulin (HCC)  metFORMIN (GLUCOPHAGE-XR) 500 MG extended release tablet      5. Acute bacterial sinusitis  doxycycline hyclate (VIBRA-TABS) 100 MG tablet      6. Colon cancer screening  Fecal DNA Colorectal cancer screening (Cologuard)            Side effects of current medications reviewed and questions answered. Follow up in 6 months or prn.

## 2023-01-05 DIAGNOSIS — E78.00 PURE HYPERCHOLESTEROLEMIA: ICD-10-CM

## 2023-01-05 DIAGNOSIS — E11.3293 TYPE 2 DIABETES MELLITUS WITH BOTH EYES AFFECTED BY MILD NONPROLIFERATIVE RETINOPATHY WITHOUT MACULAR EDEMA, WITHOUT LONG-TERM CURRENT USE OF INSULIN (HCC): ICD-10-CM

## 2023-01-05 LAB
A/G RATIO: 2 (ref 1.1–2.2)
ALBUMIN SERPL-MCNC: 4.5 G/DL (ref 3.4–5)
ALP BLD-CCNC: 106 U/L (ref 40–129)
ALT SERPL-CCNC: 27 U/L (ref 10–40)
ANION GAP SERPL CALCULATED.3IONS-SCNC: 13 MMOL/L (ref 3–16)
AST SERPL-CCNC: 26 U/L (ref 15–37)
BILIRUB SERPL-MCNC: 1.3 MG/DL (ref 0–1)
BUN BLDV-MCNC: 14 MG/DL (ref 7–20)
CALCIUM SERPL-MCNC: 10.5 MG/DL (ref 8.3–10.6)
CHLORIDE BLD-SCNC: 101 MMOL/L (ref 99–110)
CHOLESTEROL, TOTAL: 183 MG/DL (ref 0–199)
CO2: 28 MMOL/L (ref 21–32)
CREAT SERPL-MCNC: 0.6 MG/DL (ref 0.6–1.2)
GFR SERPL CREATININE-BSD FRML MDRD: >60 ML/MIN/{1.73_M2}
GLUCOSE BLD-MCNC: 222 MG/DL (ref 70–99)
HDLC SERPL-MCNC: 61 MG/DL (ref 40–60)
LDL CHOLESTEROL CALCULATED: 89 MG/DL
POTASSIUM SERPL-SCNC: 4.3 MMOL/L (ref 3.5–5.1)
SODIUM BLD-SCNC: 142 MMOL/L (ref 136–145)
TOTAL PROTEIN: 6.8 G/DL (ref 6.4–8.2)
TRIGL SERPL-MCNC: 166 MG/DL (ref 0–150)
VLDLC SERPL CALC-MCNC: 33 MG/DL

## 2023-01-06 ENCOUNTER — OFFICE VISIT (OUTPATIENT)
Dept: FAMILY MEDICINE CLINIC | Age: 73
End: 2023-01-06
Payer: COMMERCIAL

## 2023-01-06 VITALS
DIASTOLIC BLOOD PRESSURE: 70 MMHG | WEIGHT: 156.2 LBS | OXYGEN SATURATION: 98 % | TEMPERATURE: 97.8 F | SYSTOLIC BLOOD PRESSURE: 118 MMHG | BODY MASS INDEX: 28.57 KG/M2 | HEART RATE: 84 BPM | RESPIRATION RATE: 14 BRPM

## 2023-01-06 DIAGNOSIS — E78.00 PURE HYPERCHOLESTEROLEMIA: ICD-10-CM

## 2023-01-06 DIAGNOSIS — E11.9 TYPE 2 DIABETES MELLITUS WITHOUT COMPLICATION, WITHOUT LONG-TERM CURRENT USE OF INSULIN (HCC): ICD-10-CM

## 2023-01-06 DIAGNOSIS — E11.3293 TYPE 2 DIABETES MELLITUS WITH BOTH EYES AFFECTED BY MILD NONPROLIFERATIVE RETINOPATHY WITHOUT MACULAR EDEMA, WITHOUT LONG-TERM CURRENT USE OF INSULIN (HCC): Primary | ICD-10-CM

## 2023-01-06 DIAGNOSIS — B96.89 ACUTE BACTERIAL SINUSITIS: ICD-10-CM

## 2023-01-06 DIAGNOSIS — Z23 NEED FOR VACCINATION: ICD-10-CM

## 2023-01-06 DIAGNOSIS — J01.90 ACUTE BACTERIAL SINUSITIS: ICD-10-CM

## 2023-01-06 DIAGNOSIS — Z12.11 COLON CANCER SCREENING: ICD-10-CM

## 2023-01-06 LAB
ESTIMATED AVERAGE GLUCOSE: 154.2 MG/DL
HBA1C MFR BLD: 7 %

## 2023-01-06 PROCEDURE — G8399 PT W/DXA RESULTS DOCUMENT: HCPCS | Performed by: FAMILY MEDICINE

## 2023-01-06 PROCEDURE — 3051F HG A1C>EQUAL 7.0%<8.0%: CPT | Performed by: FAMILY MEDICINE

## 2023-01-06 PROCEDURE — 99214 OFFICE O/P EST MOD 30 MIN: CPT | Performed by: FAMILY MEDICINE

## 2023-01-06 PROCEDURE — 2022F DILAT RTA XM EVC RTNOPTHY: CPT | Performed by: FAMILY MEDICINE

## 2023-01-06 PROCEDURE — G8427 DOCREV CUR MEDS BY ELIG CLIN: HCPCS | Performed by: FAMILY MEDICINE

## 2023-01-06 PROCEDURE — G8484 FLU IMMUNIZE NO ADMIN: HCPCS | Performed by: FAMILY MEDICINE

## 2023-01-06 PROCEDURE — 1090F PRES/ABSN URINE INCON ASSESS: CPT | Performed by: FAMILY MEDICINE

## 2023-01-06 PROCEDURE — 3017F COLORECTAL CA SCREEN DOC REV: CPT | Performed by: FAMILY MEDICINE

## 2023-01-06 PROCEDURE — G8417 CALC BMI ABV UP PARAM F/U: HCPCS | Performed by: FAMILY MEDICINE

## 2023-01-06 PROCEDURE — 1123F ACP DISCUSS/DSCN MKR DOCD: CPT | Performed by: FAMILY MEDICINE

## 2023-01-06 PROCEDURE — 1036F TOBACCO NON-USER: CPT | Performed by: FAMILY MEDICINE

## 2023-01-06 RX ORDER — DOXYCYCLINE HYCLATE 100 MG
100 TABLET ORAL 2 TIMES DAILY
Qty: 20 TABLET | Refills: 0 | Status: SHIPPED | OUTPATIENT
Start: 2023-01-06 | End: 2023-01-16

## 2023-01-06 RX ORDER — METFORMIN HYDROCHLORIDE 500 MG/1
1000 TABLET, EXTENDED RELEASE ORAL
Qty: 180 TABLET | Refills: 1 | Status: SHIPPED | OUTPATIENT
Start: 2023-01-06

## 2023-01-06 ASSESSMENT — PATIENT HEALTH QUESTIONNAIRE - PHQ9
2. FEELING DOWN, DEPRESSED OR HOPELESS: 0
SUM OF ALL RESPONSES TO PHQ QUESTIONS 1-9: 0
SUM OF ALL RESPONSES TO PHQ9 QUESTIONS 1 & 2: 0
SUM OF ALL RESPONSES TO PHQ QUESTIONS 1-9: 0
1. LITTLE INTEREST OR PLEASURE IN DOING THINGS: 0

## 2023-03-11 DIAGNOSIS — E11.3293 TYPE 2 DIABETES MELLITUS WITH BOTH EYES AFFECTED BY MILD NONPROLIFERATIVE RETINOPATHY WITHOUT MACULAR EDEMA, WITHOUT LONG-TERM CURRENT USE OF INSULIN (HCC): ICD-10-CM

## 2023-03-13 NOTE — TELEPHONE ENCOUNTER
Requested Prescriptions     Pending Prescriptions Disp Refills    dapagliflozin (FARXIGA) 10 MG tablet [Pharmacy Med Name: FARXIGA 10MG TABLETS] 90 tablet 0     Sig: TAKE 1 TABLET BY MOUTH EVERY MORNING          Last Office Visit: 1/6/2023     Next Office Visit: Visit date not found     Last Labs: 1/2/23

## 2023-08-24 ENCOUNTER — HOSPITAL ENCOUNTER (OUTPATIENT)
Dept: WOMENS IMAGING | Age: 73
Discharge: HOME | End: 2023-08-24
Payer: COMMERCIAL

## 2023-08-24 VITALS — HEIGHT: 63 IN | WEIGHT: 155 LBS | BODY MASS INDEX: 27.46 KG/M2

## 2023-08-24 DIAGNOSIS — Z12.31 VISIT FOR SCREENING MAMMOGRAM: ICD-10-CM

## 2023-08-24 PROCEDURE — 77063 BREAST TOMOSYNTHESIS BI: CPT

## 2024-06-12 ENCOUNTER — HOSPITAL ENCOUNTER (OUTPATIENT)
Age: 74
Discharge: HOME | End: 2024-06-14
Payer: MEDICARE

## 2024-06-12 VITALS
DIASTOLIC BLOOD PRESSURE: 81 MMHG | BODY MASS INDEX: 26.68 KG/M2 | HEART RATE: 78 BPM | HEIGHT: 62 IN | SYSTOLIC BLOOD PRESSURE: 144 MMHG | WEIGHT: 145 LBS

## 2024-06-12 VITALS — WEIGHT: 145 LBS | BODY MASS INDEX: 26.68 KG/M2 | HEIGHT: 62 IN

## 2024-06-12 DIAGNOSIS — R94.31 NONSPECIFIC ABNORMAL ELECTROCARDIOGRAM (ECG) (EKG): ICD-10-CM

## 2024-06-12 LAB
ECHO BSA: 1.7 M2
NUC STRESS DIASTOLIC VOLUME INDEX: 8 ML/M2
NUC STRESS EJECTION FRACTION: 86 %
NUC STRESS LV EDV: 56 ML (ref 56–104)
NUC STRESS LV MASS: 105 G
STRESS BASELINE DIAS BP: 81 MMHG
STRESS BASELINE HR: 78 BPM
STRESS BASELINE SYS BP: 144 MMHG
STRESS ESTIMATED WORKLOAD: 7 METS
STRESS EXERCISE DUR MIN: 5 MIN
STRESS EXERCISE DUR SEC: 0 SEC
STRESS O2 SAT PEAK: 95 %
STRESS O2 SAT REST: 97 %
STRESS PEAK DIAS BP: 78 MMHG
STRESS PEAK SYS BP: 192 MMHG
STRESS PERCENT HR ACHIEVED: 90 %
STRESS POST PEAK HR: 131 BPM
STRESS RATE PRESSURE PRODUCT: NORMAL BPM*MMHG
STRESS TARGET HR: 146 BPM
TID: 0.96

## 2024-06-12 PROCEDURE — 78452 HT MUSCLE IMAGE SPECT MULT: CPT

## 2024-06-12 PROCEDURE — A9502 TC99M TETROFOSMIN: HCPCS | Performed by: INTERNAL MEDICINE

## 2024-06-12 PROCEDURE — 3430000000 HC RX DIAGNOSTIC RADIOPHARMACEUTICAL: Performed by: INTERNAL MEDICINE

## 2024-06-12 PROCEDURE — 93017 CV STRESS TEST TRACING ONLY: CPT

## 2024-06-12 RX ADMIN — TETROFOSMIN 10.3 MILLICURIE: 1.38 INJECTION, POWDER, LYOPHILIZED, FOR SOLUTION INTRAVENOUS at 08:10

## 2024-06-12 RX ADMIN — TETROFOSMIN 31.5 MILLICURIE: 1.38 INJECTION, POWDER, LYOPHILIZED, FOR SOLUTION INTRAVENOUS at 09:42

## 2024-09-06 ENCOUNTER — PRE-PROCEDURE TELEPHONE (OUTPATIENT)
Dept: WOMENS IMAGING | Age: 74
End: 2024-09-06

## 2024-09-06 ENCOUNTER — TELEPHONE (OUTPATIENT)
Dept: WOMENS IMAGING | Age: 74
End: 2024-09-06

## 2024-09-06 ENCOUNTER — HOSPITAL ENCOUNTER (OUTPATIENT)
Dept: ULTRASOUND IMAGING | Age: 74
Discharge: HOME | End: 2024-09-06
Payer: MEDICARE

## 2024-09-06 ENCOUNTER — HOSPITAL ENCOUNTER (OUTPATIENT)
Dept: WOMENS IMAGING | Age: 74
Discharge: HOME | End: 2024-09-06
Payer: MEDICARE

## 2024-09-06 VITALS — WEIGHT: 140 LBS | BODY MASS INDEX: 24.8 KG/M2 | HEIGHT: 63 IN

## 2024-09-06 DIAGNOSIS — N64.4 MASTODYNIA: ICD-10-CM

## 2024-09-06 PROCEDURE — 76642 ULTRASOUND BREAST LIMITED: CPT

## 2024-09-06 PROCEDURE — G0279 TOMOSYNTHESIS, MAMMO: HCPCS

## 2024-09-18 ENCOUNTER — HOSPITAL ENCOUNTER (OUTPATIENT)
Dept: WOMENS IMAGING | Age: 74
Discharge: HOME | End: 2024-09-18
Payer: MEDICARE

## 2024-09-18 ENCOUNTER — HOSPITAL ENCOUNTER (OUTPATIENT)
Dept: ULTRASOUND IMAGING | Age: 74
Discharge: HOME | End: 2024-09-18
Payer: MEDICARE

## 2024-09-18 DIAGNOSIS — R92.8 ABNORMAL MAMMOGRAM: ICD-10-CM

## 2024-09-18 PROCEDURE — 77065 DX MAMMO INCL CAD UNI: CPT

## 2024-09-18 PROCEDURE — 2500000003 HC RX 250 WO HCPCS: Performed by: NURSE PRACTITIONER

## 2024-09-18 PROCEDURE — 19083 BX BREAST 1ST LESION US IMAG: CPT

## 2024-09-18 RX ORDER — LIDOCAINE HYDROCHLORIDE 10 MG/ML
5 INJECTION, SOLUTION EPIDURAL; INFILTRATION; INTRACAUDAL; PERINEURAL ONCE
Status: COMPLETED | OUTPATIENT
Start: 2024-09-18 | End: 2024-09-18

## 2024-09-18 RX ORDER — LIDOCAINE HYDROCHLORIDE AND EPINEPHRINE 10; 10 MG/ML; UG/ML
20 INJECTION, SOLUTION INFILTRATION; PERINEURAL ONCE
Status: COMPLETED | OUTPATIENT
Start: 2024-09-18 | End: 2024-09-18

## 2024-09-18 RX ADMIN — LIDOCAINE HYDROCHLORIDE ANHYDROUS 5 ML: 10 INJECTION, SOLUTION INFILTRATION at 14:32

## 2024-09-18 RX ADMIN — LIDOCAINE HYDROCHLORIDE,EPINEPHRINE BITARTRATE 10 ML: 10; .01 INJECTION, SOLUTION INFILTRATION; PERINEURAL at 14:33

## 2024-09-18 ASSESSMENT — PAIN SCALES - GENERAL: PAINLEVEL_OUTOF10: 3

## 2024-09-18 ASSESSMENT — PAIN DESCRIPTION - ORIENTATION: ORIENTATION: LEFT

## 2024-09-18 ASSESSMENT — PAIN DESCRIPTION - LOCATION: LOCATION: BREAST

## 2024-09-18 ASSESSMENT — PAIN - FUNCTIONAL ASSESSMENT: PAIN_FUNCTIONAL_ASSESSMENT: ACTIVITIES ARE NOT PREVENTED

## 2024-09-18 ASSESSMENT — PAIN DESCRIPTION - DESCRIPTORS: DESCRIPTORS: DISCOMFORT;BURNING

## 2024-09-23 ENCOUNTER — TELEPHONE (OUTPATIENT)
Dept: WOMENS IMAGING | Age: 74
End: 2024-09-23

## 2024-09-24 ENCOUNTER — TELEPHONE (OUTPATIENT)
Dept: SURGERY | Age: 74
End: 2024-09-24

## 2024-09-24 RX ORDER — SEMAGLUTIDE 1.34 MG/ML
INJECTION, SOLUTION SUBCUTANEOUS WEEKLY
COMMUNITY

## 2024-09-30 ENCOUNTER — INITIAL CONSULT (OUTPATIENT)
Dept: SURGERY | Age: 74
End: 2024-09-30
Payer: MEDICARE

## 2024-09-30 VITALS
HEIGHT: 63 IN | SYSTOLIC BLOOD PRESSURE: 139 MMHG | RESPIRATION RATE: 16 BRPM | WEIGHT: 144 LBS | OXYGEN SATURATION: 98 % | HEART RATE: 78 BPM | BODY MASS INDEX: 25.52 KG/M2 | DIASTOLIC BLOOD PRESSURE: 82 MMHG

## 2024-09-30 DIAGNOSIS — C50.912 PRIMARY MALIGNANT NEOPLASM OF LEFT BREAST (HCC): Primary | ICD-10-CM

## 2024-09-30 DIAGNOSIS — C50.912 PRIMARY BREAST MALIGNANCY, LEFT (HCC): Primary | ICD-10-CM

## 2024-09-30 PROCEDURE — 99417 PROLNG OP E/M EACH 15 MIN: CPT | Performed by: SURGERY

## 2024-09-30 PROCEDURE — 99205 OFFICE O/P NEW HI 60 MIN: CPT | Performed by: SURGERY

## 2024-09-30 RX ORDER — GABAPENTIN 100 MG/1
100 CAPSULE ORAL PRN
COMMUNITY

## 2024-09-30 RX ORDER — ESCITALOPRAM OXALATE 5 MG/1
5 TABLET ORAL PRN
COMMUNITY

## 2024-09-30 ASSESSMENT — ENCOUNTER SYMPTOMS
RESPIRATORY NEGATIVE: 1
GASTROINTESTINAL NEGATIVE: 1
EYES NEGATIVE: 1

## 2024-10-01 DIAGNOSIS — C50.912 PRIMARY BREAST MALIGNANCY, LEFT (HCC): Primary | ICD-10-CM

## 2024-10-01 NOTE — PROGRESS NOTES
PCP:  Medical Oncology:  Radiation:  Other:      cT2N0M x  STAGE:  IIB left breast cancer      HPI:  Ms. Villegas is a 74 y.o. woman who presents today with a new diagnosis of grade 3, left sided invasive breast cancer, ER  low/weak positive  AK- HER2 negative.     Prior to this she has never had breast related problems and has never required a breast biopsy.  She has no family history of breast or ovarian cancer.  She reports that a few weeks prior she noticed a soreness and tightness of her bra.  After evaluating over a few days she believes she had a mass in the breast which prompted her workup.  She has not identified any skin changes, color changes, nipple discharge, or changes to the nipple-areolar complex.     She is here today in initial consultation to discuss her recent breast diagnosis.  She was referred by  radiology    INTERVAL HISTORY:  On 9/6/2024 she underwent bilateral breast imaging.  Stable findings are noted in the right breast.  In the location of her palpable concern in the left breast there is a focal asymmetry measuring 2 cm.  Additional stable findings are noted.  Ultrasound correlate of the left breast 3 o'clock position identified a 1.9 x 1.4 x 1.9 cm mass but measured up to 2.3 cm in greatest dimensions.  The left axilla was negative.  BI-RADS 4C.      On 9/18/2024  she underwent  core needle biopsy of the left breast.  Pathology identified high-grade invasive mammary carcinoma.  ER  low positive (5%/week)  AK negative HER2  negative . Pathology below [id number]       Review of Systems   Constitutional: Negative.    HENT: Negative.     Eyes: Negative.    Respiratory: Negative.     Cardiovascular: Negative.    Gastrointestinal: Negative.    Genitourinary: Negative.    Musculoskeletal: Negative.    Skin: Negative.    Neurological: Negative.    Psychiatric/Behavioral: Negative.     All other systems reviewed and are negative.  :    There isno new onset of persistent dry cough, abdominal

## 2024-10-02 ENCOUNTER — TELEPHONE (OUTPATIENT)
Dept: WOMENS IMAGING | Age: 74
End: 2024-10-02

## 2024-10-02 DIAGNOSIS — Z17.0 MALIGNANT NEOPLASM OF LEFT BREAST IN FEMALE, ESTROGEN RECEPTOR POSITIVE, UNSPECIFIED SITE OF BREAST (HCC): Primary | ICD-10-CM

## 2024-10-02 DIAGNOSIS — C50.912 MALIGNANT NEOPLASM OF LEFT BREAST IN FEMALE, ESTROGEN RECEPTOR POSITIVE, UNSPECIFIED SITE OF BREAST (HCC): Primary | ICD-10-CM

## 2024-12-09 ENCOUNTER — TELEPHONE (OUTPATIENT)
Dept: SURGERY | Age: 74
End: 2024-12-09